# Patient Record
Sex: FEMALE | Race: OTHER | HISPANIC OR LATINO | URBAN - METROPOLITAN AREA
[De-identification: names, ages, dates, MRNs, and addresses within clinical notes are randomized per-mention and may not be internally consistent; named-entity substitution may affect disease eponyms.]

---

## 2021-05-10 ENCOUNTER — INPATIENT (INPATIENT)
Facility: HOSPITAL | Age: 66
LOS: 5 days | Discharge: HOME | DRG: 389 | End: 2021-05-16
Attending: INTERNAL MEDICINE | Admitting: INTERNAL MEDICINE
Payer: MEDICARE

## 2021-05-10 VITALS
WEIGHT: 125 LBS | HEART RATE: 76 BPM | RESPIRATION RATE: 18 BRPM | DIASTOLIC BLOOD PRESSURE: 82 MMHG | OXYGEN SATURATION: 98 % | SYSTOLIC BLOOD PRESSURE: 156 MMHG | TEMPERATURE: 98 F

## 2021-05-10 DIAGNOSIS — Z90.710 ACQUIRED ABSENCE OF BOTH CERVIX AND UTERUS: Chronic | ICD-10-CM

## 2021-05-10 DIAGNOSIS — Z98.89 OTHER SPECIFIED POSTPROCEDURAL STATES: Chronic | ICD-10-CM

## 2021-05-10 DIAGNOSIS — Z90.49 ACQUIRED ABSENCE OF OTHER SPECIFIED PARTS OF DIGESTIVE TRACT: Chronic | ICD-10-CM

## 2021-05-10 LAB
ALBUMIN SERPL ELPH-MCNC: 3.6 G/DL — SIGNIFICANT CHANGE UP (ref 3.5–5)
ALP SERPL-CCNC: 76 U/L — SIGNIFICANT CHANGE UP (ref 40–120)
ALT FLD-CCNC: 37 U/L DA — SIGNIFICANT CHANGE UP (ref 10–60)
ANION GAP SERPL CALC-SCNC: 7 MMOL/L — SIGNIFICANT CHANGE UP (ref 5–17)
APPEARANCE UR: CLEAR — SIGNIFICANT CHANGE UP
AST SERPL-CCNC: 46 U/L — HIGH (ref 10–40)
BACTERIA # UR AUTO: ABNORMAL /HPF
BASOPHILS # BLD AUTO: 0.05 K/UL — SIGNIFICANT CHANGE UP (ref 0–0.2)
BASOPHILS NFR BLD AUTO: 0.3 % — SIGNIFICANT CHANGE UP (ref 0–2)
BILIRUB SERPL-MCNC: 0.4 MG/DL — SIGNIFICANT CHANGE UP (ref 0.2–1.2)
BILIRUB UR-MCNC: NEGATIVE — SIGNIFICANT CHANGE UP
BUN SERPL-MCNC: 13 MG/DL — SIGNIFICANT CHANGE UP (ref 7–18)
CALCIUM SERPL-MCNC: 9.5 MG/DL — SIGNIFICANT CHANGE UP (ref 8.4–10.5)
CHLORIDE SERPL-SCNC: 104 MMOL/L — SIGNIFICANT CHANGE UP (ref 96–108)
CO2 SERPL-SCNC: 28 MMOL/L — SIGNIFICANT CHANGE UP (ref 22–31)
COLOR SPEC: YELLOW — SIGNIFICANT CHANGE UP
CREAT SERPL-MCNC: 0.76 MG/DL — SIGNIFICANT CHANGE UP (ref 0.5–1.3)
DIFF PNL FLD: ABNORMAL
EOSINOPHIL # BLD AUTO: 0.02 K/UL — SIGNIFICANT CHANGE UP (ref 0–0.5)
EOSINOPHIL NFR BLD AUTO: 0.1 % — SIGNIFICANT CHANGE UP (ref 0–6)
EPI CELLS # UR: SIGNIFICANT CHANGE UP /HPF
GLUCOSE SERPL-MCNC: 145 MG/DL — HIGH (ref 70–99)
GLUCOSE UR QL: NEGATIVE — SIGNIFICANT CHANGE UP
HCT VFR BLD CALC: 43.7 % — SIGNIFICANT CHANGE UP (ref 34.5–45)
HGB BLD-MCNC: 14.2 G/DL — SIGNIFICANT CHANGE UP (ref 11.5–15.5)
HYALINE CASTS # UR AUTO: ABNORMAL /LPF
IMM GRANULOCYTES NFR BLD AUTO: 0.4 % — SIGNIFICANT CHANGE UP (ref 0–1.5)
KETONES UR-MCNC: ABNORMAL
LACTATE SERPL-SCNC: 1.2 MMOL/L — SIGNIFICANT CHANGE UP (ref 0.7–2)
LEUKOCYTE ESTERASE UR-ACNC: ABNORMAL
LIDOCAIN IGE QN: 107 U/L — SIGNIFICANT CHANGE UP (ref 73–393)
LYMPHOCYTES # BLD AUTO: 14.7 % — SIGNIFICANT CHANGE UP (ref 13–44)
LYMPHOCYTES # BLD AUTO: 2.45 K/UL — SIGNIFICANT CHANGE UP (ref 1–3.3)
MCHC RBC-ENTMCNC: 29.5 PG — SIGNIFICANT CHANGE UP (ref 27–34)
MCHC RBC-ENTMCNC: 32.5 GM/DL — SIGNIFICANT CHANGE UP (ref 32–36)
MCV RBC AUTO: 90.7 FL — SIGNIFICANT CHANGE UP (ref 80–100)
MONOCYTES # BLD AUTO: 0.47 K/UL — SIGNIFICANT CHANGE UP (ref 0–0.9)
MONOCYTES NFR BLD AUTO: 2.8 % — SIGNIFICANT CHANGE UP (ref 2–14)
NEUTROPHILS # BLD AUTO: 13.57 K/UL — HIGH (ref 1.8–7.4)
NEUTROPHILS NFR BLD AUTO: 81.7 % — HIGH (ref 43–77)
NITRITE UR-MCNC: NEGATIVE — SIGNIFICANT CHANGE UP
NRBC # BLD: 0 /100 WBCS — SIGNIFICANT CHANGE UP (ref 0–0)
PH UR: 6.5 — SIGNIFICANT CHANGE UP (ref 5–8)
PLATELET # BLD AUTO: 377 K/UL — SIGNIFICANT CHANGE UP (ref 150–400)
POTASSIUM SERPL-MCNC: 4.4 MMOL/L — SIGNIFICANT CHANGE UP (ref 3.5–5.3)
POTASSIUM SERPL-SCNC: 4.4 MMOL/L — SIGNIFICANT CHANGE UP (ref 3.5–5.3)
PROT SERPL-MCNC: 8.2 G/DL — SIGNIFICANT CHANGE UP (ref 6–8.3)
PROT UR-MCNC: NEGATIVE — SIGNIFICANT CHANGE UP
RBC # BLD: 4.82 M/UL — SIGNIFICANT CHANGE UP (ref 3.8–5.2)
RBC # FLD: 12.9 % — SIGNIFICANT CHANGE UP (ref 10.3–14.5)
RBC CASTS # UR COMP ASSIST: ABNORMAL /HPF (ref 0–2)
SODIUM SERPL-SCNC: 139 MMOL/L — SIGNIFICANT CHANGE UP (ref 135–145)
SP GR SPEC: 1.01 — SIGNIFICANT CHANGE UP (ref 1.01–1.02)
UROBILINOGEN FLD QL: NEGATIVE — SIGNIFICANT CHANGE UP
WBC # BLD: 16.62 K/UL — HIGH (ref 3.8–10.5)
WBC # FLD AUTO: 16.62 K/UL — HIGH (ref 3.8–10.5)
WBC UR QL: SIGNIFICANT CHANGE UP /HPF (ref 0–5)

## 2021-05-10 PROCEDURE — 74177 CT ABD & PELVIS W/CONTRAST: CPT | Mod: 26,MA

## 2021-05-10 PROCEDURE — 99285 EMERGENCY DEPT VISIT HI MDM: CPT | Mod: CS

## 2021-05-10 RX ORDER — PANTOPRAZOLE SODIUM 20 MG/1
40 TABLET, DELAYED RELEASE ORAL ONCE
Refills: 0 | Status: COMPLETED | OUTPATIENT
Start: 2021-05-10 | End: 2021-05-10

## 2021-05-10 RX ORDER — SODIUM CHLORIDE 9 MG/ML
1000 INJECTION INTRAMUSCULAR; INTRAVENOUS; SUBCUTANEOUS ONCE
Refills: 0 | Status: COMPLETED | OUTPATIENT
Start: 2021-05-10 | End: 2021-05-10

## 2021-05-10 RX ORDER — ONDANSETRON 8 MG/1
4 TABLET, FILM COATED ORAL ONCE
Refills: 0 | Status: COMPLETED | OUTPATIENT
Start: 2021-05-10 | End: 2021-05-10

## 2021-05-10 RX ORDER — MORPHINE SULFATE 50 MG/1
4 CAPSULE, EXTENDED RELEASE ORAL ONCE
Refills: 0 | Status: DISCONTINUED | OUTPATIENT
Start: 2021-05-10 | End: 2021-05-10

## 2021-05-10 RX ORDER — MORPHINE SULFATE 50 MG/1
2 CAPSULE, EXTENDED RELEASE ORAL ONCE
Refills: 0 | Status: DISCONTINUED | OUTPATIENT
Start: 2021-05-10 | End: 2021-05-10

## 2021-05-10 RX ADMIN — MORPHINE SULFATE 4 MILLIGRAM(S): 50 CAPSULE, EXTENDED RELEASE ORAL at 22:08

## 2021-05-10 RX ADMIN — PANTOPRAZOLE SODIUM 40 MILLIGRAM(S): 20 TABLET, DELAYED RELEASE ORAL at 22:39

## 2021-05-10 RX ADMIN — MORPHINE SULFATE 2 MILLIGRAM(S): 50 CAPSULE, EXTENDED RELEASE ORAL at 22:39

## 2021-05-10 RX ADMIN — ONDANSETRON 4 MILLIGRAM(S): 8 TABLET, FILM COATED ORAL at 22:08

## 2021-05-10 RX ADMIN — MORPHINE SULFATE 4 MILLIGRAM(S): 50 CAPSULE, EXTENDED RELEASE ORAL at 22:38

## 2021-05-10 RX ADMIN — SODIUM CHLORIDE 1000 MILLILITER(S): 9 INJECTION INTRAMUSCULAR; INTRAVENOUS; SUBCUTANEOUS at 22:08

## 2021-05-10 NOTE — ED PROVIDER NOTE - CLINICAL SUMMARY MEDICAL DECISION MAKING FREE TEXT BOX
65 year old female with PMHx of gastritis, HLD, and neuropathy and no significant PSHx presents to the ED with complaints of abdominal pain. Will obtain labs, UA, and CT abdomen. Patient given morphine for pain, Zofran, and IV fluids. Will reassess. 65 year old female with PMHx of gastritis, HLD, and neuropathy and no significant PSHx presents to the ED with complaints of abdominal pain. Will obtain labs, UA, and CT abdomen. Patient given morphine for pain, Zofran, and IV fluids. Will reassess.    labs show wbc 16K, cmp unremarkable  CT shows Findings most consistent with enterocolitis with partial obstruction in the right lower quadrant of the abdomen may be due to focal small bowel wall thickening related to enteritis. Small abdominal pelvic ascites. Normal appendix.  Discussed above with patient. Currently reporting severe pain, given morphine, cipro/flagyl.   Consulted surgical house officer Dr. Akhtar for Ct findings of partial bowel obstruction.  Patient stable for admission.

## 2021-05-10 NOTE — ED PROVIDER NOTE - OBJECTIVE STATEMENT
65 year old female with PMHx of gastritis, HLD, and neuropathy and no significant PSHx presents to the ED with complaints of abdominal pain. Patient reports onset of the pain at approximately 12:00 today, and states that the pain has been waxing and waning since then. Patient complains of severe cramping associated with three episodes of vomiting, as well as two episodes of nonbloody loose stools. Patient states that she had three episodes of loose bowel movements last night after having chicken and green plantains. Patient otherwise denies any fever, cough, and all other acute complaints. Of note, patient evaluation was performed in Norwegian language. NKDA.

## 2021-05-11 DIAGNOSIS — Z29.9 ENCOUNTER FOR PROPHYLACTIC MEASURES, UNSPECIFIED: ICD-10-CM

## 2021-05-11 DIAGNOSIS — E78.5 HYPERLIPIDEMIA, UNSPECIFIED: ICD-10-CM

## 2021-05-11 DIAGNOSIS — G62.9 POLYNEUROPATHY, UNSPECIFIED: ICD-10-CM

## 2021-05-11 DIAGNOSIS — K52.9 NONINFECTIVE GASTROENTERITIS AND COLITIS, UNSPECIFIED: ICD-10-CM

## 2021-05-11 DIAGNOSIS — K29.70 GASTRITIS, UNSPECIFIED, WITHOUT BLEEDING: ICD-10-CM

## 2021-05-11 LAB
A1C WITH ESTIMATED AVERAGE GLUCOSE RESULT: 6.1 % — HIGH (ref 4–5.6)
ALBUMIN SERPL ELPH-MCNC: 2.8 G/DL — LOW (ref 3.5–5)
ALP SERPL-CCNC: 63 U/L — SIGNIFICANT CHANGE UP (ref 40–120)
ALT FLD-CCNC: 29 U/L DA — SIGNIFICANT CHANGE UP (ref 10–60)
ANION GAP SERPL CALC-SCNC: 7 MMOL/L — SIGNIFICANT CHANGE UP (ref 5–17)
AST SERPL-CCNC: 21 U/L — SIGNIFICANT CHANGE UP (ref 10–40)
BASOPHILS # BLD AUTO: 0.02 K/UL — SIGNIFICANT CHANGE UP (ref 0–0.2)
BASOPHILS NFR BLD AUTO: 0.2 % — SIGNIFICANT CHANGE UP (ref 0–2)
BILIRUB SERPL-MCNC: 0.3 MG/DL — SIGNIFICANT CHANGE UP (ref 0.2–1.2)
BUN SERPL-MCNC: 12 MG/DL — SIGNIFICANT CHANGE UP (ref 7–18)
CALCIUM SERPL-MCNC: 7.9 MG/DL — LOW (ref 8.4–10.5)
CHLORIDE SERPL-SCNC: 109 MMOL/L — HIGH (ref 96–108)
CHOLEST SERPL-MCNC: 145 MG/DL — SIGNIFICANT CHANGE UP
CO2 SERPL-SCNC: 25 MMOL/L — SIGNIFICANT CHANGE UP (ref 22–31)
CREAT SERPL-MCNC: 0.76 MG/DL — SIGNIFICANT CHANGE UP (ref 0.5–1.3)
EOSINOPHIL # BLD AUTO: 0.23 K/UL — SIGNIFICANT CHANGE UP (ref 0–0.5)
EOSINOPHIL NFR BLD AUTO: 1.8 % — SIGNIFICANT CHANGE UP (ref 0–6)
ESTIMATED AVERAGE GLUCOSE: 128 MG/DL — HIGH (ref 68–114)
GLUCOSE BLDC GLUCOMTR-MCNC: 140 MG/DL — HIGH (ref 70–99)
GLUCOSE SERPL-MCNC: 161 MG/DL — HIGH (ref 70–99)
HCT VFR BLD CALC: 38.1 % — SIGNIFICANT CHANGE UP (ref 34.5–45)
HDLC SERPL-MCNC: 74 MG/DL — SIGNIFICANT CHANGE UP
HGB BLD-MCNC: 12.3 G/DL — SIGNIFICANT CHANGE UP (ref 11.5–15.5)
IMM GRANULOCYTES NFR BLD AUTO: 0.3 % — SIGNIFICANT CHANGE UP (ref 0–1.5)
LIPID PNL WITH DIRECT LDL SERPL: 59 MG/DL — SIGNIFICANT CHANGE UP
LYMPHOCYTES # BLD AUTO: 1.12 K/UL — SIGNIFICANT CHANGE UP (ref 1–3.3)
LYMPHOCYTES # BLD AUTO: 8.8 % — LOW (ref 13–44)
MAGNESIUM SERPL-MCNC: 2 MG/DL — SIGNIFICANT CHANGE UP (ref 1.6–2.6)
MCHC RBC-ENTMCNC: 30 PG — SIGNIFICANT CHANGE UP (ref 27–34)
MCHC RBC-ENTMCNC: 32.3 GM/DL — SIGNIFICANT CHANGE UP (ref 32–36)
MCV RBC AUTO: 92.9 FL — SIGNIFICANT CHANGE UP (ref 80–100)
MONOCYTES # BLD AUTO: 0.25 K/UL — SIGNIFICANT CHANGE UP (ref 0–0.9)
MONOCYTES NFR BLD AUTO: 2 % — SIGNIFICANT CHANGE UP (ref 2–14)
NEUTROPHILS # BLD AUTO: 11.01 K/UL — HIGH (ref 1.8–7.4)
NEUTROPHILS NFR BLD AUTO: 86.9 % — HIGH (ref 43–77)
NON HDL CHOLESTEROL: 71 MG/DL — SIGNIFICANT CHANGE UP
NRBC # BLD: 0 /100 WBCS — SIGNIFICANT CHANGE UP (ref 0–0)
PHOSPHATE SERPL-MCNC: 3.1 MG/DL — SIGNIFICANT CHANGE UP (ref 2.5–4.5)
PLATELET # BLD AUTO: 318 K/UL — SIGNIFICANT CHANGE UP (ref 150–400)
POTASSIUM SERPL-MCNC: 4.4 MMOL/L — SIGNIFICANT CHANGE UP (ref 3.5–5.3)
POTASSIUM SERPL-SCNC: 4.4 MMOL/L — SIGNIFICANT CHANGE UP (ref 3.5–5.3)
PROT SERPL-MCNC: 6.4 G/DL — SIGNIFICANT CHANGE UP (ref 6–8.3)
RBC # BLD: 4.1 M/UL — SIGNIFICANT CHANGE UP (ref 3.8–5.2)
RBC # FLD: 13.1 % — SIGNIFICANT CHANGE UP (ref 10.3–14.5)
SARS-COV-2 RNA SPEC QL NAA+PROBE: SIGNIFICANT CHANGE UP
SODIUM SERPL-SCNC: 141 MMOL/L — SIGNIFICANT CHANGE UP (ref 135–145)
TRIGL SERPL-MCNC: 59 MG/DL — SIGNIFICANT CHANGE UP
TSH SERPL-MCNC: 0.35 UU/ML — SIGNIFICANT CHANGE UP (ref 0.34–4.82)
WBC # BLD: 12.67 K/UL — HIGH (ref 3.8–10.5)
WBC # FLD AUTO: 12.67 K/UL — HIGH (ref 3.8–10.5)

## 2021-05-11 PROCEDURE — 99223 1ST HOSP IP/OBS HIGH 75: CPT

## 2021-05-11 RX ORDER — ENOXAPARIN SODIUM 100 MG/ML
40 INJECTION SUBCUTANEOUS DAILY
Refills: 0 | Status: DISCONTINUED | OUTPATIENT
Start: 2021-05-11 | End: 2021-05-16

## 2021-05-11 RX ORDER — ACETAMINOPHEN 500 MG
650 TABLET ORAL EVERY 6 HOURS
Refills: 0 | Status: DISCONTINUED | OUTPATIENT
Start: 2021-05-11 | End: 2021-05-16

## 2021-05-11 RX ORDER — SIMVASTATIN 20 MG/1
1 TABLET, FILM COATED ORAL
Qty: 0 | Refills: 0 | DISCHARGE

## 2021-05-11 RX ORDER — GABAPENTIN 400 MG/1
300 CAPSULE ORAL
Refills: 0 | Status: DISCONTINUED | OUTPATIENT
Start: 2021-05-11 | End: 2021-05-16

## 2021-05-11 RX ORDER — CIPROFLOXACIN LACTATE 400MG/40ML
400 VIAL (ML) INTRAVENOUS ONCE
Refills: 0 | Status: COMPLETED | OUTPATIENT
Start: 2021-05-11 | End: 2021-05-11

## 2021-05-11 RX ORDER — GABAPENTIN 400 MG/1
1 CAPSULE ORAL
Qty: 0 | Refills: 0 | DISCHARGE

## 2021-05-11 RX ORDER — MORPHINE SULFATE 50 MG/1
2 CAPSULE, EXTENDED RELEASE ORAL ONCE
Refills: 0 | Status: DISCONTINUED | OUTPATIENT
Start: 2021-05-11 | End: 2021-05-11

## 2021-05-11 RX ORDER — OXYCODONE AND ACETAMINOPHEN 5; 325 MG/1; MG/1
1 TABLET ORAL EVERY 6 HOURS
Refills: 0 | Status: DISCONTINUED | OUTPATIENT
Start: 2021-05-11 | End: 2021-05-13

## 2021-05-11 RX ORDER — METRONIDAZOLE 500 MG
500 TABLET ORAL ONCE
Refills: 0 | Status: COMPLETED | OUTPATIENT
Start: 2021-05-11 | End: 2021-05-11

## 2021-05-11 RX ORDER — ONDANSETRON 8 MG/1
4 TABLET, FILM COATED ORAL EVERY 4 HOURS
Refills: 0 | Status: DISCONTINUED | OUTPATIENT
Start: 2021-05-11 | End: 2021-05-16

## 2021-05-11 RX ORDER — SODIUM CHLORIDE 9 MG/ML
1000 INJECTION, SOLUTION INTRAVENOUS
Refills: 0 | Status: DISCONTINUED | OUTPATIENT
Start: 2021-05-11 | End: 2021-05-12

## 2021-05-11 RX ORDER — SIMVASTATIN 20 MG/1
40 TABLET, FILM COATED ORAL AT BEDTIME
Refills: 0 | Status: DISCONTINUED | OUTPATIENT
Start: 2021-05-11 | End: 2021-05-16

## 2021-05-11 RX ORDER — FAMOTIDINE 10 MG/ML
1 INJECTION INTRAVENOUS
Qty: 0 | Refills: 0 | DISCHARGE

## 2021-05-11 RX ORDER — METRONIDAZOLE 500 MG
500 TABLET ORAL EVERY 8 HOURS
Refills: 0 | Status: DISCONTINUED | OUTPATIENT
Start: 2021-05-11 | End: 2021-05-13

## 2021-05-11 RX ORDER — FAMOTIDINE 10 MG/ML
40 INJECTION INTRAVENOUS DAILY
Refills: 0 | Status: DISCONTINUED | OUTPATIENT
Start: 2021-05-11 | End: 2021-05-16

## 2021-05-11 RX ORDER — CIPROFLOXACIN LACTATE 400MG/40ML
400 VIAL (ML) INTRAVENOUS EVERY 12 HOURS
Refills: 0 | Status: DISCONTINUED | OUTPATIENT
Start: 2021-05-11 | End: 2021-05-13

## 2021-05-11 RX ADMIN — Medication 100 MILLIGRAM(S): at 01:07

## 2021-05-11 RX ADMIN — Medication 500 MILLIGRAM(S): at 02:21

## 2021-05-11 RX ADMIN — MORPHINE SULFATE 2 MILLIGRAM(S): 50 CAPSULE, EXTENDED RELEASE ORAL at 01:04

## 2021-05-11 RX ADMIN — SODIUM CHLORIDE 80 MILLILITER(S): 9 INJECTION, SOLUTION INTRAVENOUS at 03:27

## 2021-05-11 RX ADMIN — ENOXAPARIN SODIUM 40 MILLIGRAM(S): 100 INJECTION SUBCUTANEOUS at 11:22

## 2021-05-11 RX ADMIN — FAMOTIDINE 40 MILLIGRAM(S): 10 INJECTION INTRAVENOUS at 11:22

## 2021-05-11 RX ADMIN — Medication 200 MILLIGRAM(S): at 01:07

## 2021-05-11 RX ADMIN — Medication 100 MILLIGRAM(S): at 13:42

## 2021-05-11 RX ADMIN — Medication 100 MILLIGRAM(S): at 21:52

## 2021-05-11 RX ADMIN — SODIUM CHLORIDE 1000 MILLILITER(S): 9 INJECTION INTRAMUSCULAR; INTRAVENOUS; SUBCUTANEOUS at 00:21

## 2021-05-11 RX ADMIN — Medication 400 MILLIGRAM(S): at 02:19

## 2021-05-11 RX ADMIN — Medication 200 MILLIGRAM(S): at 17:57

## 2021-05-11 RX ADMIN — GABAPENTIN 300 MILLIGRAM(S): 400 CAPSULE ORAL at 17:57

## 2021-05-11 RX ADMIN — SIMVASTATIN 40 MILLIGRAM(S): 20 TABLET, FILM COATED ORAL at 21:52

## 2021-05-11 RX ADMIN — GABAPENTIN 300 MILLIGRAM(S): 400 CAPSULE ORAL at 05:24

## 2021-05-11 NOTE — H&P ADULT - ATTENDING COMMENTS
Patient is a 65 year old female with a PMH of HLD, Prediabetes, Gastritis, Neuropathy, s/p cholecystectomy, s/p Total abdominal hysterectomy who presented with a chief complaint of abdominal pain.  ( ID: [id number])  Patient states that on the day of current presentation, she began to experience an intermittent, generalized abdominal pain associated with several episodes of watery stools as well as non-bloody emesis.    At time of examination in the ED, patient denies any headache, dizziness, chest pain, palpitations, shortness of breath.    T(C): 36.7 (05-10-21 @ 21:45), Max: 36.7 (05-10-21 @ 21:45)  T(F): 98 (05-10-21 @ 21:45), Max: 98 (05-10-21 @ 21:45)  HR: 76 (05-10-21 @ 21:45) (76 - 76)  BP: 156/82 (05-10-21 @ 21:45) (156/82 - 156/82)  RR: 18 (05-10-21 @ 21:45) (18 - 18)  SpO2: 98% (05-10-21 @ 21:45) (98% - 98%)  Wt(kg): --    P/E: As above MAR    A/P:    Abdominal Pain likely due to Partial Small Bowel Obstruction:  -CT Abdomen/Pelvis with IV contrast identified multiple prominent fluid-filled loops of small bowel with wall thickening and enhancement most consistent with partial obstruction in the right lower quadrant of the abdomen may be due to focal small bowel wall thickening related to enteritis.  Mild pancolonic wall thickening. Distended stomach with air-fluid level. Scattered colonic diverticulosis without diverticulitis. Small abdominal pelvic ascites, notably in the right paracolic gutter and cul-de-sac.  -Zofran PRN for nausea  -Reglan PRN for vomiting  -Will continue to closely monitor EKG for evidence of QT prolongation  -PPI BID  -Will keep patient NPO (except meds) for now  -If patient continues to experience episodes of non-bloody emesis, will place NG Tube for decompression, though can likely hold for now  -Will start patient on gentle IVF hydration  -Will start patient on Cipro and Flagyl  -Will need to ask GI to evaluate patient for further recommendations    Gastritis:  -As above    Hepatic lesions:  -CT Abdomen/Pelvis with IV contrast identified scattered hepatic cysts and subcentimeter hypoenhancing foci, too small to characterize.  Small perihepatic ascites.  -Patient will need to follow-up with Hepatology as an outpatient after discharge    Leukocytosis:  -Will hold antimicrobials, for now  -Nevertheless, will send ESR, CRP, Procalcitonin    Prediabetes:  -Hemoglobin A1c with AM labs  -Blood Glucose Monitoring ACHS  -Regular Insulin Sliding Scale ACHS  -Will keep patient NPO (except meds) for now    HLD:  -Lipid Panel with AM labs  -Will resume patient's home medications    Mild Transaminitis:  -Will send Hepatitis Panel    GI/DVT PPx:  -PPI BID  -Lovenox Patient is a 65 year old female with a PMH of HLD, Prediabetes, Gastritis, Neuropathy, s/p cholecystectomy, s/p Total abdominal hysterectomy who presented with a chief complaint of abdominal pain.  ( ID: [id number])  Patient states that on the day of current presentation, she began to experience an intermittent, generalized abdominal pain associated with several episodes of watery stools as well as non-bloody emesis.    At time of examination in the ED, patient denies any headache, dizziness, chest pain, palpitations, shortness of breath.    T(C): 36.7 (05-10-21 @ 21:45), Max: 36.7 (05-10-21 @ 21:45)  T(F): 98 (05-10-21 @ 21:45), Max: 98 (05-10-21 @ 21:45)  HR: 76 (05-10-21 @ 21:45) (76 - 76)  BP: 156/82 (05-10-21 @ 21:45) (156/82 - 156/82)  RR: 18 (05-10-21 @ 21:45) (18 - 18)  SpO2: 98% (05-10-21 @ 21:45) (98% - 98%)  Wt(kg): --    P/E: As above MAR    A/P:    Abdominal Pain likely due to Partial Small Bowel Obstruction:  -CT Abdomen/Pelvis with IV contrast identified multiple prominent fluid-filled loops of small bowel with wall thickening and enhancement most consistent with partial obstruction in the right lower quadrant of the abdomen may be due to focal small bowel wall thickening related to enteritis.  Mild pancolonic wall thickening. Distended stomach with air-fluid level. Scattered colonic diverticulosis without diverticulitis. Small abdominal pelvic ascites, notably in the right paracolic gutter and cul-de-sac.  -Zofran PRN for nausea  -Reglan PRN for vomiting  -Will continue to closely monitor EKG for evidence of QT prolongation  -PPI BID  -Will keep patient NPO (except meds) for now  -If patient continues to experience episodes of non-bloody emesis, will place NG Tube for decompression, though can likely hold for now  -Will start patient on gentle IVF hydration  -Will start patient on Cipro and Flagyl  -Will need to ask GI to evaluate patient for further recommendations    Nausea and Vomiting:  -As above    Watery Diarrhea:  -If patient continues to have episodes of watery diarrhea, will consider sending cdiff as well as stool electrolytes (Sodium, Potassium) in order to calculate stool osmotic gap    Gastritis:  -As above    Hepatic lesions:  -CT Abdomen/Pelvis with IV contrast identified scattered hepatic cysts and subcentimeter hypoenhancing foci, too small to characterize.  Small perihepatic ascites.  -Patient will need to follow-up with Hepatology as an outpatient after discharge    Leukocytosis:  -Will hold antimicrobials, for now  -Nevertheless, will send ESR, CRP, Procalcitonin    Prediabetes:  -Hemoglobin A1c with AM labs  -Blood Glucose Monitoring ACHS  -Regular Insulin Sliding Scale ACHS  -Will keep patient NPO (except meds) for now    HLD:  -Lipid Panel with AM labs  -Will resume patient's home medications    Mild Transaminitis:  -Will send Hepatitis Panel    GI/DVT PPx:  -PPI BID  -Lovenox

## 2021-05-11 NOTE — PROGRESS NOTE ADULT - SUBJECTIVE AND OBJECTIVE BOX
INTERVAL HPI/OVERNIGHT EVENTS:  Pt resting comfortably. No acute complaints.   States abdominal pain is improving   +flatus/-BM  Denies N/V    MEDICATIONS  (STANDING):  ciprofloxacin   IVPB 400 milliGRAM(s) IV Intermittent every 12 hours  enoxaparin Injectable 40 milliGRAM(s) SubCutaneous daily  famotidine    Tablet 40 milliGRAM(s) Oral daily  gabapentin 300 milliGRAM(s) Oral two times a day  lactated ringers. 1000 milliLiter(s) (80 mL/Hr) IV Continuous <Continuous>  metroNIDAZOLE  IVPB 500 milliGRAM(s) IV Intermittent every 8 hours  simvastatin 40 milliGRAM(s) Oral at bedtime    MEDICATIONS  (PRN):  acetaminophen   Tablet .. 650 milliGRAM(s) Oral every 6 hours PRN Mild Pain (1 - 3)  ondansetron Injectable 4 milliGRAM(s) IV Push every 4 hours PRN Nausea and/or Vomiting  oxycodone    5 mG/acetaminophen 325 mG 1 Tablet(s) Oral every 6 hours PRN Moderate Pain (4 - 6)      Vital Signs Last 24 Hrs  T(C): 36.8 (11 May 2021 09:58), Max: 36.8 (11 May 2021 09:58)  T(F): 98.2 (11 May 2021 09:58), Max: 98.2 (11 May 2021 09:58)  HR: 66 (11 May 2021 09:58) (64 - 76)  BP: 110/61 (11 May 2021 09:58) (98/59 - 156/82)  RR: 14 (11 May 2021 09:58) (14 - 18)  SpO2: 99% (11 May 2021 09:58) (98% - 99%)    Physical:  General: A&Ox3. NAD  Chest: respiration unlabored  Abdomen: Soft nondistended, nontender.    LABS:                        12.3   12.67 )-----------( 318      ( 11 May 2021 05:34 )             38.1             05-11    141  |  109<H>  |  12  ----------------------------<  161<H>  4.4   |  25  |  0.76    Ca    7.9<L>      11 May 2021 05:34  Phos  3.1     05-11  Mg     2.0     05-11    TPro  6.4  /  Alb  2.8<L>  /  TBili  0.3  /  DBili  x   /  AST  21  /  ALT  29  /  AlkPhos  63  05-11

## 2021-05-11 NOTE — CONSULT NOTE ADULT - SUBJECTIVE AND OBJECTIVE BOX
GI INITIAL CONSULT    HPI:  65 year old female with PMHx of Gastritis, HLD, Pre-diabetes and neuropathy and PSHx of cholecystectomy and hysterectomy presents to the ED with complaints of abdominal pain since today afternoon. Pt reports diffuse abdominal pain, more pronounced in LLQ and RLQ, sharp in nature, 10/10 in intensity, non-radiating, associated with 3 episodes of watery diarrhea and NBNB  vomiting yesterday. On CT Abd fount to have enterocolitis and partial SBO. This morning patient looks comfortable resting in bed, stated that abd pain has significantly improved, had not had any more episodes of diarrhea or vomiting Pt denies any fever, chest pain, palpitations, shortness of breath, urinary symptoms or any other acute complaints.    Meds:  MEDICATIONS  (STANDING):  ciprofloxacin   IVPB 400 milliGRAM(s) IV Intermittent every 12 hours  enoxaparin Injectable 40 milliGRAM(s) SubCutaneous daily  famotidine    Tablet 40 milliGRAM(s) Oral daily  gabapentin 300 milliGRAM(s) Oral two times a day  lactated ringers. 1000 milliLiter(s) (80 mL/Hr) IV Continuous <Continuous>  metroNIDAZOLE  IVPB 500 milliGRAM(s) IV Intermittent every 8 hours  simvastatin 40 milliGRAM(s) Oral at bedtime     SH: non contributory   FH: non contributory    ROS:  CONSTITUTIONAL: No fever, weight loss, or fatigue  EYES: No eye pain, visual disturbances, or discharge  ENT:  No difficulty hearing, tinnitus, vertigo; No sinus or throat pain  NECK: No pain or stiffness  RESPIRATORY: No cough, wheezing, chills or hemoptysis, shortness of Breath  CARDIOVASCULAR: No chest pain, palpitations, passing out, dizziness, or leg swelling  GASTROINTESTINAL: see HPI  GENITOURINARY: No dysuria, frequency, hematuria, or incontinence  NEUROLOGICAL: No headaches, memory loss, loss of strength, numbness, or tremors  SKIN: No itching, burning, rashes, or lesions   MUSCULOSKELETAL: No arthralgia, myalgia, or back pain.     Vitals:   Vital Signs Last 24 Hrs  T(C): 36.6 (11 May 2021 05:13), Max: 36.7 (10 May 2021 21:45)  T(F): 97.8 (11 May 2021 05:13), Max: 98 (10 May 2021 21:45)  HR: 71 (11 May 2021 05:13) (71 - 76)  BP: 145/78 (11 May 2021 05:13) (145/78 - 156/82)    Gen: NAD  CVS: S1/S2  Chest: CTABL  Abd: mild distention, mild tenderness in LLQ, soft, warm to touch                          12.3   12.67 )-----------( 318      ( 11 May 2021 05:34 )             38.1   05-11    141  |  109<H>  |  12  ----------------------------<  161<H>  4.4   |  25  |  0.76    Ca    7.9<L>      11 May 2021 05:34  Phos  3.1     05-11  Mg     2.0     05-11    TPro  6.4  /  Alb  2.8<L>  /  TBili  0.3  /  DBili  x   /  AST  21  /  ALT  29  /  AlkPhos  63  05-11        Imaging:   EXAM:  CT ABDOMEN AND PELVIS IC                            PROCEDURE DATE:  05/10/2021      INTERPRETATION:  CLINICAL INFORMATION: Diffuse abdominal pain.    FINDINGS:    LOWER CHEST: Bibasilar dependent changes. No pleural effusion.    LIVER/GALLBLADDER: Scattered hepatic cysts and subcentimeter hypoenhancing foci, too small to characterize. Normal size with homogenous enhancement. Post cholecystectomy biliary duct dilatation. Small perihepatic ascites.    PANCREAS: No pancreatic ductal dilatation or peripancreatic fluid collection.    SPLEEN: Normal in size and enhancement.    KIDNEYS: No hydronephrosis. Tiny subcentimeter hypoenhancing focus in the upper pole the left kidney, too small to characterize.    ADRENAL GLANDS: Unremarkable.    BOWEL: Multiple prominent fluid-filled loops of small bowel with wall thickening and enhancement. Fecalized small bowel loop in the right lower quadrant of the abdomen with gradual tapering (series 2 images 93-97) Mild pancolonic wall thickening. Distended stomach with air-fluid level. Scattered colonic diverticulosis without diverticulitis. Normal appendix. No bowel obstruction or free air. Small abdominal pelvic ascites, notably in the right paracolic gutter and cul-de-sac.    URINARY BLADDER: Mildly distended.    PELVIC ORGANS: Unremarkable.    LYMPH NODES: No mesenteric or para-aortic lymphadenopathy.    BONES/SOFT TISSUES: Unremarkable.    AORTA/MAJOR BRANCHES: Unremarkable.    IMPRESSION:    Findings most consistent with enterocolitis with partial obstruction in the right lower quadrant of the abdomen may be due to focal small bowel wall thickening related to enteritis. Small abdominal pelvic ascites. Normal appendix.

## 2021-05-11 NOTE — H&P ADULT - ASSESSMENT
65 year old female with PMHx of Gastritis, HLD, Pre-diabetes and neuropathy and PSHx of cholecystectomy and hysterectomy presents to the ED with complaints of abdominal pain since today afternoon.       In ED, /82, HR 76    Pt is admitted for Enterocolitis

## 2021-05-11 NOTE — H&P ADULT - NSHPPHYSICALEXAM_GEN_ALL_CORE
General - NAD  Eyes - PERRLA, EOM intact  ENT - Nonicteric sclerae, PERRLA, EOMI. Oropharynx clear. Moist mucous membranes. Conjunctivae appear well perfused.   Neck - No noticeable or palpable swelling, redness or rash around throat or on face  Lymph Nodes - No lymphadenopathy  Cardiovascular - RRR no m/r/g, no JVD, no carotid bruits  Lungs - Clear to ascultation, no use of accessory muscles, no crackles or wheezes.  Skin - No rashes, skin warm and dry, no erythematous areas  Abdomen - Diffuse abdominal tenderness present. Normal bowel sounds, no hepatosplenomegaly.  Extremities - No edema, cyanosis or clubbing  MusculoSkeletal - 5/5 strength, normal range of motion, no swollen or erythematous  joints.  Neurological – Alert and oriented x 3, CN 2-12 grossly intact.

## 2021-05-11 NOTE — H&P ADULT - PROBLEM SELECTOR PLAN 1
p/w diffuse abdominal pain and tenderness, nausea, vomiting and diarrhea since afternoon  WBC 16 K, afebrile  CT A/P shows enterocolitis with partial obstruction in the right lower quadrant of the abdomen may be due to focal small bowel wall thickening related to enteritis. Small abdominal pelvic ascites, notably in the right paracolic gutter and cul-de-sac.  NPO for now  c/w iv fluids  Started on Cipro and Flagyl  Zofran prn for nausea   Surgery consulted: Pt might need NGT decompression   GI consult p/w diffuse abdominal pain and tenderness, nausea, vomiting and diarrhea since afternoon  WBC 16 K, afebrile  CT A/P shows enterocolitis with partial obstruction in the right lower quadrant of the abdomen may be due to focal small bowel wall thickening related to enteritis. Small abdominal pelvic ascites, notably in the right paracolic gutter and cul-de-sac.  NPO for now  c/w iv fluids  Started on Cipro and Flagyl  Zofran prn for nausea   Surgery consulted: Pt might need NGT decompression   GI Dr. Almeida

## 2021-05-11 NOTE — H&P ADULT - HISTORY OF PRESENT ILLNESS
65 year old female with PMHx of Gastritis, HLD, Pre-diabetes and neuropathy and no significant PSHx presents to the ED with complaints of abdominal pain 65 year old female with PMHx of Gastritis, HLD, Pre-diabetes and neuropathy and PSHx of cholecystectomy and hysterectomy presents to the ED with complaints of abdominal pain since today afternoon. Pt reports diffuse abdominal pain, sharp in nature, 10/10 in intensity, non-radiating, associated with 3 episodes of diarrhea and NBNB  vomiting today. Pt denies any fever, chest pain, palpitations, shortness of breath, urinary symptoms or any other acute complaints.    In ED, /82, HR 76 65 year old female with PMHx of Gastritis, HLD, Pre-diabetes and neuropathy and PSHx of cholecystectomy and hysterectomy presents to the ED with complaints of abdominal pain since today afternoon. Pt reports diffuse abdominal pain, more pronounced in LLQ and RLQ, sharp in nature, 10/10 in intensity, non-radiating, associated with 3 episodes of watery diarrhea and NBNB  vomiting today. Pt denies any fever, chest pain, palpitations, shortness of breath, urinary symptoms or any other acute complaints.    In ED, /82, HR 76

## 2021-05-11 NOTE — CONSULT NOTE ADULT - SUBJECTIVE AND OBJECTIVE BOX
65 year old female with PMHx of gastritis, HLD, and neuropathy and significant PSHx including open jamie, SVEN,  presents to the ED with complaints of abdominal pain. Patient reports onset of the pain at approximately 12:00 today, and states that the pain has been waxing and waning since then. Patient complains of severe cramping associated with three episodes of vomiting, as well as two episodes of nonbloody loose stools. Patient states that she had three episodes of loose bowel movements last night after having chicken and green plantains. Patient otherwise denies any fever, cough, and all other acute complaints.    < from: CT Abdomen and Pelvis w/ IV Cont (05.10.21 @ 23:31) >  BOWEL: Multiple prominent fluid-filled loops of small bowel with wall thickening and enhancement. Fecalized small bowel loop in the right lower quadrant of the abdomen with gradual tapering (series 2 images 93-97) Mild pancolonic wall thickening. Distended stomach with air-fluid level. Scattered colonic diverticulosis without diverticulitis. Normal appendix. No bowel obstruction or free air. Small abdominal pelvic ascites, notably in the right paracolic gutter and cul-de-sac.    URINARY BLADDER: Mildly distended.    PELVIC ORGANS: Unremarkable.    LYMPH NODES: No mesenteric or para-aortic lymphadenopathy.    BONES/SOFT TISSUES: Unremarkable.    AORTA/MAJOR BRANCHES: Unremarkable.    IMPRESSION:    Findings most consistent with enterocolitis with partial obstruction in the right lower quadrant of the abdomen may be due to focal small bowel wall thickening related to enteritis. Small abdominal pelvic ascites. Normal appendix.    < end of copied text >    pt acknowledges bowel function, no current vomiting    Vital Signs Last 24 Hrs  T(C): 36.7 (10 May 2021 21:45), Max: 36.7 (10 May 2021 21:45)  T(F): 98 (10 May 2021 21:45), Max: 98 (10 May 2021 21:45)  HR: 76 (10 May 2021 21:45) (76 - 76)  BP: 156/82 (10 May 2021 21:45) (156/82 - 156/82)  BP(mean): --  RR: 18 (10 May 2021 21:45) (18 - 18)  SpO2: 98% (10 May 2021 21:45) (98% - 98%)                          14.2   16.62 )-----------( 377      ( 10 May 2021 22:11 )             43.7   05-10    139  |  104  |  13  ----------------------------<  145<H>  4.4   |  28  |  0.76    Ca    9.5      10 May 2021 22:11    TPro  8.2  /  Alb  3.6  /  TBili  0.4  /  DBili  x   /  AST  46<H>  /  ALT  37  /  AlkPhos  76  05-10    Pt awake, alert, NAD  S1S2  good b/l air entry  abd softly distended, generalized mild tenderness, no rebound or guarding    A/P  64 y/o female, pre-diabetic, c/o 1 day onset generalized abd pain with n/v/diarrhea after chicken/plaintains the night before and started again at noon earlier today  CT c/w enterocolitis with psbo in RLQ, currently no vomiting  recommend npo, hydrate, iv abx, serial exams, if n/v would need NG decompression  discussed with Dr Gonsalez

## 2021-05-11 NOTE — PROGRESS NOTE ADULT - ASSESSMENT
65F presenting with abdominal pain  CT consistent with enterocolitis with partial obstruction     vss, leukocytosis trending down  abdominal exam benign, +flatus    -NPO, IVF  -serial exams  -if nausea/vomiting continues would need NGT  -F/u GI reccs  -Remainder of care per primary team

## 2021-05-11 NOTE — CONSULT NOTE ADULT - ASSESSMENT
64 YO F with N/V, diarrhea x 5 episodes, on CT abd found to have enterocolitis with partial obstruction in the right lower quadrant of the abdomen, currently no more episodes of vomiting and diarrhea    #partial SBO, enteroclitis   - continue with cipro/flagyl as per medical team  - continue NPO  - GI PCR panel  - consider NGT if pt continues to vomit  66 YO F with N/V, diarrhea x 5 episodes, on CT abd found to have enterocolitis with partial obstruction in the right lower quadrant of the abdomen, currently no more episodes of vomiting and diarrhea    #partial SBO, enteroclitis   - continue with cipro/flagyl as per medical team  - continue NPO  - GI PCR panel  - consider NGT if pt continues to vomit       Attending Addendum:  65F p/w enterocolitis and partial SBO; has a h/o multiple abd surgeries.  -cont NPO for now  -check GI PCR panel to r/o infectious etiology  -cont IVF  -cont empiric abx  -can advance diet tomorrow to clear liquids

## 2021-05-11 NOTE — CHART NOTE - NSCHARTNOTEFT_GEN_A_CORE
EVENT: abdominal pain improving     OBJECTIVE:  Vital Signs Last 24 Hrs  T(C): 36.8 (11 May 2021 09:58), Max: 36.8 (11 May 2021 09:58)  T(F): 98.2 (11 May 2021 09:58), Max: 98.2 (11 May 2021 09:58)  HR: 66 (11 May 2021 09:58) (64 - 76)  BP: 110/61 (11 May 2021 09:58) (98/59 - 156/82)  BP(mean): --  RR: 14 (11 May 2021 09:58) (14 - 18)  SpO2: 99% (11 May 2021 09:58) (98% - 99%)    ROS: mild abd pain and nausea. no vomiting     PHYSICAL EXAM:  GENERAL: appears uncomfortable   NECK: Supple, No JVD  CHEST/LUNG: Clear to auscultation bilaterally; No wheeze  HEART: Regular rate and rhythm; No murmurs, rubs, or gallops  ABDOMEN: Soft, +ve tenderness, Bowel sounds present  EXTREMITIES:  2+ Peripheral Pulses, No clubbing, cyanosis, or edema  PSYCH: AAOx3  NEUROLOGY: non-focal  SKIN: No rashes or lesions      LABS:                        12.3   12.67 )-----------( 318      ( 11 May 2021 05:34 )             38.1     05-11    141  |  109<H>  |  12  ----------------------------<  161<H>  4.4   |  25  |  0.76    Ca    7.9<L>      11 May 2021 05:34  Phos  3.1     05-11  Mg     2.0     05-11    TPro  6.4  /  Alb  2.8<L>  /  TBili  0.3  /  DBili  x   /  AST  21  /  ALT  29  /  AlkPhos  63  05-11      < from: CT Abdomen and Pelvis w/ IV Cont (05.10.21 @ 23:31) >      EXAM:  CT ABDOMEN AND PELVIS IC                            PROCEDURE DATE:  05/10/2021          INTERPRETATION:  CLINICAL INFORMATION: Diffuse abdominal pain.    COMPARISON: None.    TECHNIQUE: Axial CT of the abdomen and pelvis was performed afterthe administration of oral and intravenous contrast. Coronal and sagittal reformatted images were submitted.    CONTRAST/COMPLICATIONS:  IV Contrast: Omnipaque 350  90 cc administered   10 cc discarded  Oral Contrast: NONE  Complications: None reported at time of study completion    FINDINGS:    LOWER CHEST: Bibasilar dependent changes. No pleural effusion.    LIVER/GALLBLADDER: Scattered hepatic cysts and subcentimeter hypoenhancing foci, too small to characterize. Normal size with homogenous enhancement. Post cholecystectomy biliary duct dilatation. Small perihepatic ascites.    PANCREAS: No pancreatic ductal dilatation or peripancreatic fluid collection.    SPLEEN: Normal in size and enhancement.    KIDNEYS: No hydronephrosis. Tiny subcentimeter hypoenhancing focus in the upper pole the left kidney, too small to characterize.    ADRENAL GLANDS: Unremarkable.    BOWEL: Multiple prominent fluid-filled loops of small bowel with wall thickening and enhancement. Fecalized small bowel loop in the right lower quadrant of the abdomen with gradual tapering (series 2 images 93-97) Mild pancolonic wall thickening. Distended stomach with air-fluid level. Scattered colonic diverticulosis without diverticulitis. Normal appendix. No bowel obstruction or free air. Small abdominal pelvic ascites, notably in the right paracolic gutter and cul-de-sac.    URINARY BLADDER: Mildly distended.    PELVIC ORGANS: Unremarkable.    LYMPH NODES: No mesenteric or para-aortic lymphadenopathy.    BONES/SOFT TISSUES: Unremarkable.    AORTA/MAJOR BRANCHES: Unremarkable.    IMPRESSION:    Findings most consistent with enterocolitis with partial obstruction in the right lower quadrant of the abdomen may be due to focal small bowel wall thickening related to enteritis. Small abdominal pelvic ascites. Normal appendix.      A/P: 65 year old female with PMHx of Gastritis, HLD, Pre-diabetes and neuropathy and PSHx of cholecystectomy and hysterectomy presents to the ED with complaints of abdominal pain.  Enterocolitis: p/w abd pain, CT as above, cont NPO, IVF, abx and pain meds, followed by surgery, no acute interventions recommended. if pt vomiting, consider NGT.   DVT ppx- cont Lovenox EVENT: seen and examined     OBJECTIVE:  Vital Signs Last 24 Hrs  T(C): 36.8 (11 May 2021 09:58), Max: 36.8 (11 May 2021 09:58)  T(F): 98.2 (11 May 2021 09:58), Max: 98.2 (11 May 2021 09:58)  HR: 66 (11 May 2021 09:58) (64 - 76)  BP: 110/61 (11 May 2021 09:58) (98/59 - 156/82)  BP(mean): --  RR: 14 (11 May 2021 09:58) (14 - 18)  SpO2: 99% (11 May 2021 09:58) (98% - 99%)    ROS:  abd pain and nausea improving,  no vomiting     PHYSICAL EXAM:  GENERAL: NAD  NECK: Supple, No JVD  CHEST/LUNG: Clear to auscultation bilaterally; No wheeze  HEART: Regular rate and rhythm; No murmurs, rubs, or gallops  ABDOMEN: Soft, +ve tenderness, Bowel sounds present  EXTREMITIES:  2+ Peripheral Pulses, No clubbing, cyanosis, or edema  PSYCH: AAOx3  NEUROLOGY: non-focal  SKIN: No rashes or lesions      LABS:                        12.3   12.67 )-----------( 318      ( 11 May 2021 05:34 )             38.1     05-11    141  |  109<H>  |  12  ----------------------------<  161<H>  4.4   |  25  |  0.76    Ca    7.9<L>      11 May 2021 05:34  Phos  3.1     05-11  Mg     2.0     05-11    TPro  6.4  /  Alb  2.8<L>  /  TBili  0.3  /  DBili  x   /  AST  21  /  ALT  29  /  AlkPhos  63  05-11      < from: CT Abdomen and Pelvis w/ IV Cont (05.10.21 @ 23:31) >      EXAM:  CT ABDOMEN AND PELVIS IC                            PROCEDURE DATE:  05/10/2021          INTERPRETATION:  CLINICAL INFORMATION: Diffuse abdominal pain.    COMPARISON: None.    TECHNIQUE: Axial CT of the abdomen and pelvis was performed afterthe administration of oral and intravenous contrast. Coronal and sagittal reformatted images were submitted.    CONTRAST/COMPLICATIONS:  IV Contrast: Omnipaque 350  90 cc administered   10 cc discarded  Oral Contrast: NONE  Complications: None reported at time of study completion    FINDINGS:    LOWER CHEST: Bibasilar dependent changes. No pleural effusion.    LIVER/GALLBLADDER: Scattered hepatic cysts and subcentimeter hypoenhancing foci, too small to characterize. Normal size with homogenous enhancement. Post cholecystectomy biliary duct dilatation. Small perihepatic ascites.    PANCREAS: No pancreatic ductal dilatation or peripancreatic fluid collection.    SPLEEN: Normal in size and enhancement.    KIDNEYS: No hydronephrosis. Tiny subcentimeter hypoenhancing focus in the upper pole the left kidney, too small to characterize.    ADRENAL GLANDS: Unremarkable.    BOWEL: Multiple prominent fluid-filled loops of small bowel with wall thickening and enhancement. Fecalized small bowel loop in the right lower quadrant of the abdomen with gradual tapering (series 2 images 93-97) Mild pancolonic wall thickening. Distended stomach with air-fluid level. Scattered colonic diverticulosis without diverticulitis. Normal appendix. No bowel obstruction or free air. Small abdominal pelvic ascites, notably in the right paracolic gutter and cul-de-sac.    URINARY BLADDER: Mildly distended.    PELVIC ORGANS: Unremarkable.    LYMPH NODES: No mesenteric or para-aortic lymphadenopathy.    BONES/SOFT TISSUES: Unremarkable.    AORTA/MAJOR BRANCHES: Unremarkable.    IMPRESSION:    Findings most consistent with enterocolitis with partial obstruction in the right lower quadrant of the abdomen may be due to focal small bowel wall thickening related to enteritis. Small abdominal pelvic ascites. Normal appendix.      A/P: 65 year old female with PMHx of Gastritis, HLD, Pre-diabetes and neuropathy and PSHx of cholecystectomy and hysterectomy presents to the ED with complaints of abdominal pain.  Enterocolitis: p/w abd pain, CT as above, cont NPO, IVF, abx and pain meds, followed by surgery, no acute interventions recommended. if pt vomiting, consider NGT.   DVT ppx- cont Lovenox

## 2021-05-12 DIAGNOSIS — K76.89 OTHER SPECIFIED DISEASES OF LIVER: ICD-10-CM

## 2021-05-12 LAB
ALBUMIN SERPL ELPH-MCNC: 2.5 G/DL — LOW (ref 3.5–5)
ALP SERPL-CCNC: 53 U/L — SIGNIFICANT CHANGE UP (ref 40–120)
ALT FLD-CCNC: 23 U/L DA — SIGNIFICANT CHANGE UP (ref 10–60)
ANION GAP SERPL CALC-SCNC: 8 MMOL/L — SIGNIFICANT CHANGE UP (ref 5–17)
AST SERPL-CCNC: 22 U/L — SIGNIFICANT CHANGE UP (ref 10–40)
BILIRUB SERPL-MCNC: 0.3 MG/DL — SIGNIFICANT CHANGE UP (ref 0.2–1.2)
BUN SERPL-MCNC: 7 MG/DL — SIGNIFICANT CHANGE UP (ref 7–18)
CALCIUM SERPL-MCNC: 8.1 MG/DL — LOW (ref 8.4–10.5)
CHLORIDE SERPL-SCNC: 112 MMOL/L — HIGH (ref 96–108)
CO2 SERPL-SCNC: 23 MMOL/L — SIGNIFICANT CHANGE UP (ref 22–31)
COVID-19 SPIKE DOMAIN AB INTERP: POSITIVE
COVID-19 SPIKE DOMAIN ANTIBODY RESULT: >250 U/ML — HIGH
CREAT SERPL-MCNC: 0.66 MG/DL — SIGNIFICANT CHANGE UP (ref 0.5–1.3)
CULTURE RESULTS: SIGNIFICANT CHANGE UP
GGT SERPL-CCNC: 8 U/L — SIGNIFICANT CHANGE UP (ref 8–40)
GLUCOSE SERPL-MCNC: 96 MG/DL — SIGNIFICANT CHANGE UP (ref 70–99)
HAV IGM SER-ACNC: SIGNIFICANT CHANGE UP
HBV CORE IGM SER-ACNC: SIGNIFICANT CHANGE UP
HBV SURFACE AG SER-ACNC: SIGNIFICANT CHANGE UP
HCT VFR BLD CALC: 34.9 % — SIGNIFICANT CHANGE UP (ref 34.5–45)
HCV AB S/CO SERPL IA: 0.12 S/CO — SIGNIFICANT CHANGE UP (ref 0–0.99)
HCV AB S/CO SERPL IA: 0.12 S/CO — SIGNIFICANT CHANGE UP (ref 0–0.99)
HCV AB SERPL-IMP: SIGNIFICANT CHANGE UP
HCV AB SERPL-IMP: SIGNIFICANT CHANGE UP
HGB BLD-MCNC: 11 G/DL — LOW (ref 11.5–15.5)
MAGNESIUM SERPL-MCNC: 2.2 MG/DL — SIGNIFICANT CHANGE UP (ref 1.6–2.6)
MCHC RBC-ENTMCNC: 29.5 PG — SIGNIFICANT CHANGE UP (ref 27–34)
MCHC RBC-ENTMCNC: 31.5 GM/DL — LOW (ref 32–36)
MCV RBC AUTO: 93.6 FL — SIGNIFICANT CHANGE UP (ref 80–100)
NRBC # BLD: 0 /100 WBCS — SIGNIFICANT CHANGE UP (ref 0–0)
PHOSPHATE SERPL-MCNC: 2.8 MG/DL — SIGNIFICANT CHANGE UP (ref 2.5–4.5)
PLATELET # BLD AUTO: 270 K/UL — SIGNIFICANT CHANGE UP (ref 150–400)
POTASSIUM SERPL-MCNC: 3.8 MMOL/L — SIGNIFICANT CHANGE UP (ref 3.5–5.3)
POTASSIUM SERPL-SCNC: 3.8 MMOL/L — SIGNIFICANT CHANGE UP (ref 3.5–5.3)
PROT SERPL-MCNC: 5.6 G/DL — LOW (ref 6–8.3)
RBC # BLD: 3.73 M/UL — LOW (ref 3.8–5.2)
RBC # FLD: 13.3 % — SIGNIFICANT CHANGE UP (ref 10.3–14.5)
SARS-COV-2 IGG+IGM SERPL QL IA: >250 U/ML — HIGH
SARS-COV-2 IGG+IGM SERPL QL IA: POSITIVE
SODIUM SERPL-SCNC: 143 MMOL/L — SIGNIFICANT CHANGE UP (ref 135–145)
SPECIMEN SOURCE: SIGNIFICANT CHANGE UP
WBC # BLD: 6.41 K/UL — SIGNIFICANT CHANGE UP (ref 3.8–10.5)
WBC # FLD AUTO: 6.41 K/UL — SIGNIFICANT CHANGE UP (ref 3.8–10.5)

## 2021-05-12 PROCEDURE — 99233 SBSQ HOSP IP/OBS HIGH 50: CPT | Mod: GC

## 2021-05-12 PROCEDURE — 99223 1ST HOSP IP/OBS HIGH 75: CPT

## 2021-05-12 RX ORDER — SODIUM CHLORIDE 9 MG/ML
1000 INJECTION, SOLUTION INTRAVENOUS
Refills: 0 | Status: DISCONTINUED | OUTPATIENT
Start: 2021-05-12 | End: 2021-05-13

## 2021-05-12 RX ORDER — LANOLIN ALCOHOL/MO/W.PET/CERES
5 CREAM (GRAM) TOPICAL AT BEDTIME
Refills: 0 | Status: DISCONTINUED | OUTPATIENT
Start: 2021-05-12 | End: 2021-05-16

## 2021-05-12 RX ADMIN — OXYCODONE AND ACETAMINOPHEN 1 TABLET(S): 5; 325 TABLET ORAL at 10:20

## 2021-05-12 RX ADMIN — SIMVASTATIN 40 MILLIGRAM(S): 20 TABLET, FILM COATED ORAL at 21:21

## 2021-05-12 RX ADMIN — ENOXAPARIN SODIUM 40 MILLIGRAM(S): 100 INJECTION SUBCUTANEOUS at 11:37

## 2021-05-12 RX ADMIN — Medication 200 MILLIGRAM(S): at 05:02

## 2021-05-12 RX ADMIN — Medication 100 MILLIGRAM(S): at 21:21

## 2021-05-12 RX ADMIN — SODIUM CHLORIDE 80 MILLILITER(S): 9 INJECTION, SOLUTION INTRAVENOUS at 08:20

## 2021-05-12 RX ADMIN — Medication 100 MILLIGRAM(S): at 06:07

## 2021-05-12 RX ADMIN — FAMOTIDINE 40 MILLIGRAM(S): 10 INJECTION INTRAVENOUS at 11:37

## 2021-05-12 RX ADMIN — Medication 200 MILLIGRAM(S): at 17:05

## 2021-05-12 RX ADMIN — GABAPENTIN 300 MILLIGRAM(S): 400 CAPSULE ORAL at 17:05

## 2021-05-12 RX ADMIN — Medication 5 MILLIGRAM(S): at 22:25

## 2021-05-12 RX ADMIN — GABAPENTIN 300 MILLIGRAM(S): 400 CAPSULE ORAL at 06:07

## 2021-05-12 RX ADMIN — OXYCODONE AND ACETAMINOPHEN 1 TABLET(S): 5; 325 TABLET ORAL at 09:33

## 2021-05-12 RX ADMIN — Medication 100 MILLIGRAM(S): at 13:16

## 2021-05-12 NOTE — PROGRESS NOTE ADULT - PROBLEM SELECTOR PLAN 5
RISK                                                          Points  [  ] Previous VTE                                                3  [  ] Thrombophilia                                             2  [  ] Lower limb paralysis                                   2        (unable to hold up >15 seconds)    [  ] Current Cancer                                             2         (within 6 months)  [ x ] Immobilization > 24 hrs                              1  [  ] ICU/CCU stay > 24 hours                             1  [ x ] Age > 60                                                         1    IMPROVE VTE Score: 2  lovenox for VTE prophylaxis. c/w gabapentin

## 2021-05-12 NOTE — PROGRESS NOTE ADULT - ATTENDING COMMENTS
Patient seen and examined. Case discussed with Dr. Gonzalez. 65 female with PMHx of Gastritis, HLD, Pre-diabetes and neuropathy presents to the ED with abdominal pain. CT A/P significant for enterocolitis and partial small bowel obstruction.  This morning she reports lower abdominal discomfort with distension. Patient passing flatus. No bowel movements since admission. Abdominal exam mild tenderness Right lower quadrant, +BS.  Appreciate Surgery recommendations, conservative management and keep NPO. c/w IV fluids, c/w cipro and flagyl. Leukocytosis trending down  CT A/P also significant for multiple hepatic cyst Hepatology consulted, likely simple cysts.     ID #[id number] Patient seen and examined. Case discussed with Dr. Gonzalez. 65 female with PMHx of Gastritis, HLD, Pre-diabetes and neuropathy presents to the ED with abdominal pain. CT A/P significant for enterocolitis and partial small bowel obstruction.  This morning she reports lower abdominal discomfort with distension. Patient passing flatus. No bowel movements since admission. Abdominal exam mild tenderness Right lower quadrant, +BS.  Appreciate Surgery recommendations, conservative management and diet advanced to clear liquids, c/w cipro and flagyl. Leukocytosis trending down CT A/P also significant for multiple hepatic cyst Hepatology consulted, likely simple cysts.    [place]  ID #[id number]

## 2021-05-12 NOTE — PROGRESS NOTE ADULT - ASSESSMENT
65y.o. Female with improving enterocolitis    -con't abx course  -Keep NPO  -Pain control prn  -OOB ambulate  -f/u stool cx

## 2021-05-12 NOTE — CONSULT NOTE ADULT - PROBLEM SELECTOR RECOMMENDATION 9
On CT scan , scattered hepatic cysts noted  Simple cysts vs parasitic cyst vs neoplastic vs duct related cysts    Simple cysts On CT scan , scattered hepatic cysts noted  Simple cysts vs parasitic cyst vs neoplastic vs duct related cysts        Simple cysts- Simple cysts tend to occur more commonly in the right lobe and are more prevalent in women  since the size of the cysts are small, thus is more consistent with the imaging on CT scan.  this is the unlikely the source of the patients abdominal pain because the cysts are small and LFTs are WNL    Parasitic cysts: Echinococcal (hydatid) cysts of the liver are caused by the larval form of Echinococcus granulosus, which is usually acquired from infected dogs. unlikely because the cysts on CT scan do not resemble fluid filled sac membrane. Also, pt denies having pets or taking care of a dog.     Unlikely duct related since no cysts report on CT scan of the CBD    Unlikely neoplasm of the since LFTs are WNL and no lesions noted on CT scan.     Recommend     Hep B and Hep C panel

## 2021-05-12 NOTE — PROGRESS NOTE ADULT - ASSESSMENT
65 year old female with PMHx of Gastritis, HLD, Pre-diabetes and neuropathy and PSHx of cholecystectomy and hysterectomy presents to the ED with complaints of abdominal pain since today afternoon.       In ED, /82, HR 76    Pt is admitted for Enterocolitis  65 year old female with PMHx of Gastritis, HLD, Pre-diabetes and neuropathy and PSHx of cholecystectomy and hysterectomy presents to the ED with complaints of abdominal pain since today afternoon.     In ED, /82, HR 76    Pt is admitted for Enterocolitis

## 2021-05-12 NOTE — PROGRESS NOTE ADULT - SUBJECTIVE AND OBJECTIVE BOX
INTERVAL HPI/OVERNIGHT EVENTS:  Pt resting comfortably. Still with lower abd pain.  NPO.  +flatus, no BM  Denies N/V.    MEDICATIONS  (STANDING):  ciprofloxacin   IVPB 400 milliGRAM(s) IV Intermittent every 12 hours  enoxaparin Injectable 40 milliGRAM(s) SubCutaneous daily  famotidine    Tablet 40 milliGRAM(s) Oral daily  gabapentin 300 milliGRAM(s) Oral two times a day  lactated ringers. 1000 milliLiter(s) (80 mL/Hr) IV Continuous <Continuous>  metroNIDAZOLE  IVPB 500 milliGRAM(s) IV Intermittent every 8 hours  simvastatin 40 milliGRAM(s) Oral at bedtime    MEDICATIONS  (PRN):  acetaminophen   Tablet .. 650 milliGRAM(s) Oral every 6 hours PRN Mild Pain (1 - 3)  ondansetron Injectable 4 milliGRAM(s) IV Push every 4 hours PRN Nausea and/or Vomiting  oxycodone    5 mG/acetaminophen 325 mG 1 Tablet(s) Oral every 6 hours PRN Moderate Pain (4 - 6)    Vital Signs Last 24 Hrs  T(C): 36.7 (12 May 2021 05:33), Max: 36.9 (11 May 2021 14:20)  T(F): 98 (12 May 2021 05:33), Max: 98.5 (11 May 2021 14:20)  HR: 60 (12 May 2021 05:33) (58 - 66)  BP: 109/61 (12 May 2021 05:33) (100/55 - 110/61)  BP(mean): --  RR: 15 (12 May 2021 05:33) (14 - 17)  SpO2: 98% (12 May 2021 05:33) (98% - 99%)    Physical:  General: A&Ox3. NAD.  Abdomen: Soft nondistended, lower abd tenderness suprapubic > R.    LABS:                        11.0   6.41  )-----------( 270      ( 12 May 2021 07:45 )             34.9             05-12    143  |  112<H>  |  7   ----------------------------<  96  3.8   |  23  |  0.66    Ca    8.1<L>      12 May 2021 07:45  Phos  3.1     05-11  Mg     2.0     05-11    TPro  5.6<L>  /  Alb  2.5<L>  /  TBili  0.3  /  DBili  x   /  AST  22  /  ALT  23  /  AlkPhos  53  05-12

## 2021-05-12 NOTE — CONSULT NOTE ADULT - ASSESSMENT
This is a 65y Female with extensive medical Hx including prediabetes (HB A1c 6.1) , HLD, gastritis, GERD, neuropathy, s/p cholecystectomy, s/p hysterectomy presented on 5/11/21 with 1 days Hx of diffuse, sharp abdominal pain, with punctum maximum in RLQ and LLQ, a/w 3 episodes of watery diarrhea and NBNB vomiting, but no fever or chills.     This is a 65y Female with extensive medical Hx including prediabetes (HB A1c 6.1) , HLD, gastritis, GERD, neuropathy, s/p cholecystectomy, s/p hysterectomy presented on 5/11/21 with 1 days Hx of diffuse, sharp abdominal pain, with punctum maximum in RLQ and LLQ, a/w 3 episodes of watery diarrhea and NBNB vomiting, but no fever or chills.        # Scattered hepatic cysts  # Small subcentimeter hypoenhanced foci  - To d/w radiology if simple cysts or any suspicion for complex cyst and how many total  - In case of simple cysts, no intervention needed unless symptomatic and in this case unlikely the source of the patients abdominal pain because the cysts are small   - Can consider sending Echinococcus and Entamoeba serology  - Given her chronic Hep B consider dedicated liver imaging w MRI and send AFP      # Chronic Hep B, not on treatment as per discussion with PCP (Dr. Rick Hewitt [phone])  - Check HBsAg, HBcAb, HBsAB, HBeAb, HBeAG, HBV DNA     # Enterocolitis  - GI PCR panel, stool culture, WBC, O/P, C. diff.   - F/u GI and surgery recommendations    Thank you fo the consult  Will continue to follow  D/w primary team This is a 65y Female with extensive medical Hx including prediabetes (HB A1c 6.1) , HLD, gastritis, GERD, neuropathy, s/p cholecystectomy, s/p hysterectomy presented on 5/11/21 with 1 days Hx of diffuse, sharp abdominal pain, with punctum maximum in RLQ and LLQ, a/w 3 episodes of watery diarrhea and NBNB vomiting, but no fever or chills, admitted to medicine for enterocolitis with partial SBO, being managed conservatively, on Cipro/Flagyl. Hepatology is consulted for incidentally found hepatic cysts.        # Scattered hepatic cysts  # Small subcentimeter hypoenhancing foci  - Reviewed imaging and discussed with radiology, the larger cysts (still small in size) are simple cysts, and the even smaller ones (tiny) are too small to characterize   - In case of simple cysts, no intervention needed unless symptomatic and in this case unlikely the source of the patients abdominal pain because the cysts are small   - Can consider sending Echinococcus and Entamoeba serology although less likely  - Given her questionable chronic Hep B history (see below) send AFP  - MRI abd w/wo contrast could help to better characterize the small hypoenhancing foci (notably patient also has some biliary dilation thus would consider MRCP too at the same time)      # Chronic Hep B (not on treatment) (?) as per discussion with PCP (Dr. Rick Hewitt [phone]), but patient is not aware (although states that her mother had some kind of viral hepatitis)   - Check HBsAg, HBcAb, HBsAB, HBeAb, HBeAG, HBV DNA     # Enterocolitis  - GI PCR panel, stool culture, WBC, O/P, C. diff.   - F/u GI and surgery recommendations    Thank you fo the consult  Will continue to follow  D/w primary team

## 2021-05-12 NOTE — PROGRESS NOTE ADULT - PROBLEM SELECTOR PLAN 2
c/w famotidine P/w Abd pain, NVD and distension.   CT : Scattered hepatic cysts and subcentimeter hypoenhancing foci, too small to characterize. Normal size with homogenous enhancement. Post cholecystectomy biliary duct dilatation. Small perihepatic ascites.  CBC with leukocytosis, Neut predom. No elevation in eosinophilia  LFTs wnl, no fevers  - f/u hepatitis panel  - f/u GGT  HEPATOLOGY Dr. Jones consulted

## 2021-05-12 NOTE — PROGRESS NOTE ADULT - PROBLEM SELECTOR PLAN 1
p/w diffuse abdominal pain and tenderness, nausea, vomiting and diarrhea since afternoon  WBC 16 K, afebrile  CT A/P shows enterocolitis with partial obstruction in the right lower quadrant of the abdomen may be due to focal small bowel wall thickening related to enteritis. Small abdominal pelvic ascites, notably in the right paracolic gutter and cul-de-sac.  NPO for now  c/w iv fluids  Started on Cipro and Flagyl  Zofran prn for nausea   Surgery consulted: Pt might need NGT decompression   GI Dr. Almeida p/w diffuse abdominal pain and tenderness, nausea, vomiting and diarrhea since afternoon  WBC 16 K, afebrile  CT A/P shows enterocolitis with partial obstruction in the right lower quadrant of the abdomen may be due to focal small bowel wall thickening related to enteritis. Small abdominal pelvic ascites, notably in the right paracolic gutter and cul-de-sac.  NPO for now  c/w iv fluids  Cipro and Flagyl 5/11  Zofran prn for nausea   SURGERY consulted (Dr. Gonsalez)  -- con't abx course  -- Keep NPO  -- Pain control prn  -- OOB ambulate  -- f/u stool cx  GI Dr. Almeida  -- cont NPO for now  -- check GI PCR panel to r/o infectious etiology  -- cont IVF  -- cont empiric abx  -- can advance diet to clear liquids on 5/12 p/w diffuse abdominal pain and tenderness, nausea, vomiting and diarrhea since afternoon  WBC 16 K, afebrile  CT A/P shows enterocolitis with partial obstruction in the right lower quadrant of the abdomen may be due to focal small bowel wall thickening related to enteritis. Small abdominal pelvic ascites, notably in the right paracolic gutter and cul-de-sac.  NPO > adv to clears per surgery and GI on 5/12  c/w iv fluids  Cipro and Flagyl 5/11  Zofran prn for nausea   SURGERY consulted (Dr. Gonsalez)  -- con't abx course  -- Keep NPO  -- Pain control prn  -- OOB ambulate  -- f/u stool cx  GI Dr. Almeida  -- cont NPO for now  -- check GI PCR panel to r/o infectious etiology  -- cont IVF  -- cont empiric abx  -- can advance diet to clear liquids on 5/12

## 2021-05-12 NOTE — CONSULT NOTE ADULT - SUBJECTIVE AND OBJECTIVE BOX
Chief Complaint:  Patient is a 65y old  Female who presents with a chief complaint of Abdominal pain (12 May 2021 09:14)      HPI:  HEYDI OCONNOR is a 65y Female with extensive medical Hx including prediabetes (HB A1c 6.1) , HLD, gastritis, GERD, neuropathy, s/p cholecystectomy, s/p hysterectomy presented on 21 with 1 days Hx of diffuse, sharp abdominal pain, with punctum maximum in RLQ and LLQ, a/w 3 episodes of watery diarrhea and NBNB vomiting, but no fever or chills.    On arrival was afebrile, not tachycardic, with borderline BP (98/59 mmHg).   Labs were significant for leukocytosis (WBC 16.62-> 12.67->6.41), normal hemoglobin and PLT count (14.2 and 377), with Hb drop to 11 since admission. Hypoproteinemia (total protein 8.2-> 5.6?, alb 3.6->2.5). UA had trace Leuk esterase and hematuria, but UCx was negative.   CT abd/pelvis 5/10/21: scattered hepatic cysts and subcm hypoenhancing foci, too small to characterize, but normal liver size and echogenicity, also some biliary dilation suggested to be post cholecystectomy, and small perihepatic ascites. It also showed multiple prominent fluid-filled loops of small bowel with wall thickening and enhancement; fecalized small bowel loop in the right lower quadrant of the abdomen with gradual tapering; mild pancolonic wall thickening. and distended stomach with air-fluid level, as well as scattered colonic diverticulosis without diverticulitis. Findings were most consistent with enterocolitis with partial obstruction in the right lower quadrant of the abdomen, may be due to focal small bowel wall thickening related to enteritis.   Surgery and GI are following patient. Patient is on cipro/flagyl.   Hepatology was consulted for hepatic cysts.       PMHX/PSHX:    GERD (gastroesophageal reflux disease)  Asthma  Hyperlipidemia  Gastritis  Neuropathy  H/O abdominal hysterectomy  S/P  section  S/P cholecystectomy    Allergies:  No Known Allergies      Home Medications: reviewed  Hospital Medications:  acetaminophen   Tablet .. 650 milliGRAM(s) Oral every 6 hours PRN  ciprofloxacin   IVPB 400 milliGRAM(s) IV Intermittent every 12 hours  enoxaparin Injectable 40 milliGRAM(s) SubCutaneous daily  famotidine    Tablet 40 milliGRAM(s) Oral daily  gabapentin 300 milliGRAM(s) Oral two times a day  lactated ringers. 1000 milliLiter(s) IV Continuous <Continuous>  metroNIDAZOLE  IVPB 500 milliGRAM(s) IV Intermittent every 8 hours  ondansetron Injectable 4 milliGRAM(s) IV Push every 4 hours PRN  oxycodone    5 mG/acetaminophen 325 mG 1 Tablet(s) Oral every 6 hours PRN  simvastatin 40 milliGRAM(s) Oral at bedtime      Social History:   Tob: Denies  EtOH: Denies  Illicit Drugs: Denies    Family history:  No pertinent family history in first degree relatives      Denies family history of colon cancer/polyps, stomach cancer/polyps, pancreatic cancer/masses, liver cancer/disease, ovarian cancer and endometrial cancer.    ROS:   General:  No  fevers, chills, night sweats, fatigue  Eyes:  Good vision, no reported pain  ENT:  No sore throat, pain, runny nose  CV:  No pain, palpitations  Pulm:  No dyspnea, cough  GI:  See HPI, otherwise negative  :  No  incontinence, nocturia  Muscle:  No pain, weakness  Neuro:  No memory problems  Psych:  No insomnia, mood problems, depression  Endocrine:  No polyuria, polydipsia, cold/heat intolerance  Heme:  No petechiae, ecchymosis, easy bruisability  Skin:  No rash    PHYSICAL EXAM:   Vital Signs:  Vital Signs Last 24 Hrs  T(C): 36.7 (12 May 2021 05:33), Max: 36.9 (11 May 2021 14:20)  T(F): 98 (12 May 2021 05:33), Max: 98.5 (11 May 2021 14:20)  HR: 60 (12 May 2021 05:33) (58 - 62)  BP: 109/61 (12 May 2021 05:33) (100/55 - 109/61)  BP(mean): --  RR: 15 (12 May 2021 05:33) (15 - 17)  SpO2: 98% (12 May 2021 05:33) (98% - 99%)  Daily Height in cm: 170.18 (12 May 2021 07:37)    Daily     GENERAL: no acute distress  NEURO: alert, no asterixis  HEENT: anicteric sclera, no conjunctival pallor appreciated  CHEST: no respiratory distress, no accessory muscle use  CARDIAC: regular rate, rhythm  ABDOMEN: soft, non-tender, non-distended, no rebound or guarding  EXTREMITIES: warm, well perfused, no edema  SKIN: no lesions noted    LABS: reviewed                        11.0   6.41  )-----------( 270      ( 12 May 2021 07:45 )             34.9     05-12    143  |  112<H>  |  7   ----------------------------<  96  3.8   |  23  |  0.66    Ca    8.1<L>      12 May 2021 07:45  Phos  2.8     -  Mg     2.2     -    TPro  5.6<L>  /  Alb  2.5<L>  /  TBili  0.3  /  DBili  x   /  AST  22  /  ALT  23  /  AlkPhos  53  05-12    LIVER FUNCTIONS - ( 12 May 2021 07:45 )  Alb: 2.5 g/dL / Pro: 5.6 g/dL / ALK PHOS: 53 U/L / ALT: 23 U/L DA / AST: 22 U/L / GGT: x             Culture - Urine (collected 11 May 2021 03:54)  Source: .Urine Clean Catch (Midstream)  Final Report (12 May 2021 00:32):    <10,000 CFU/mL Normal Urogenital Sunshine        Diagnostic Studies: see sunrise for full report         Chief Complaint:  Patient is a 65y old  Female who presents with a chief complaint of Abdominal pain (12 May 2021 09:14)      HPI:  HEYDI OCONNOR is a 65y Female with extensive medical Hx including prediabetes (HB A1c 6.1) , HLD, gastritis, GERD, neuropathy, s/p cholecystectomy, s/p hysterectomy presented on 21 with 1 days Hx of diffuse, sharp abdominal pain, with punctum maximum in RLQ and LLQ, a/w 3 episodes of watery diarrhea and NBNB vomiting, but no fever or chills.    On arrival was afebrile, not tachycardic, with borderline BP (98/59 mmHg).   Labs were significant for leukocytosis (WBC 16.62-> 12.67->6.41), normal hemoglobin and PLT count (14.2 and 377), with Hb drop to 11 since admission. Hypoproteinemia (total protein 8.2-> 5.6?, alb 3.6->2.5). UA had trace Leuk esterase and hematuria, but UCx was negative.   CT abd/pelvis 5/10/21: scattered hepatic cysts and subcm hypoenhancing foci, too small to characterize, but normal liver size and echogenicity, also some biliary dilation suggested to be post cholecystectomy, and small perihepatic ascites. It also showed multiple prominent fluid-filled loops of small bowel with wall thickening and enhancement; fecalized small bowel loop in the right lower quadrant of the abdomen with gradual tapering; mild pancolonic wall thickening. and distended stomach with air-fluid level, as well as scattered colonic diverticulosis without diverticulitis. Findings were most consistent with enterocolitis with partial obstruction in the right lower quadrant of the abdomen, may be due to focal small bowel wall thickening related to enteritis.   Surgery and GI are following patient. Patient is on cipro/flagyl.   Hepatology was consulted for hepatic cysts.     Travel: moved from [place] 40 yrs ago[place]   No recent travel    Pets: no pets           PMHX/PSHX:    GERD (gastroesophageal reflux disease)  Asthma  Hyperlipidemia  Gastritis  Neuropathy  H/O abdominal hysterectomy  S/P  section  S/P cholecystectomy    Allergies:  No Known Allergies      Home Medications: reviewed  Hospital Medications:  acetaminophen   Tablet .. 650 milliGRAM(s) Oral every 6 hours PRN  ciprofloxacin   IVPB 400 milliGRAM(s) IV Intermittent every 12 hours  enoxaparin Injectable 40 milliGRAM(s) SubCutaneous daily  famotidine    Tablet 40 milliGRAM(s) Oral daily  gabapentin 300 milliGRAM(s) Oral two times a day  lactated ringers. 1000 milliLiter(s) IV Continuous <Continuous>  metroNIDAZOLE  IVPB 500 milliGRAM(s) IV Intermittent every 8 hours  ondansetron Injectable 4 milliGRAM(s) IV Push every 4 hours PRN  oxycodone    5 mG/acetaminophen 325 mG 1 Tablet(s) Oral every 6 hours PRN  simvastatin 40 milliGRAM(s) Oral at bedtime      Social History:   Tob: Denies  EtOH: Denies  Illicit Drugs: Denies    Family history:  No pertinent family history in first degree relatives      Denies family history of colon cancer/polyps, stomach cancer/polyps, pancreatic cancer/masses, liver cancer/disease, ovarian cancer and endometrial cancer.    ROS:   General:  No  fevers, chills, night sweats, fatigue  Eyes:  Good vision, no reported pain  ENT:  No sore throat, pain, runny nose  CV:  No pain, palpitations  Pulm:  No dyspnea, cough  GI:  See HPI, otherwise negative  :  No  incontinence, nocturia  Muscle:  No pain, weakness  Neuro:  No memory problems  Psych:  No insomnia, mood problems, depression  Endocrine:  No polyuria, polydipsia, cold/heat intolerance  Heme:  No petechiae, ecchymosis, easy bruisability  Skin:  No rash    PHYSICAL EXAM:   Vital Signs:  Vital Signs Last 24 Hrs  T(C): 36.7 (12 May 2021 05:33), Max: 36.9 (11 May 2021 14:20)  T(F): 98 (12 May 2021 05:33), Max: 98.5 (11 May 2021 14:20)  HR: 60 (12 May 2021 05:33) (58 - 62)  BP: 109/61 (12 May 2021 05:33) (100/55 - 109/61)  BP(mean): --  RR: 15 (12 May 2021 05:33) (15 - 17)  SpO2: 98% (12 May 2021 05:33) (98% - 99%)  Daily Height in cm: 170.18 (12 May 2021 07:37)    Daily     GENERAL: no acute distress  NEURO: alert, no asterixis  HEENT: anicteric sclera, no conjunctival pallor appreciated  CHEST: no respiratory distress, no accessory muscle use  CARDIAC: regular rate, rhythm  ABDOMEN: soft, non-tender, non-distended, no rebound or guarding  EXTREMITIES: warm, well perfused, no edema  SKIN: no lesions noted    LABS: reviewed                        11.0   6.41  )-----------( 270      ( 12 May 2021 07:45 )             34.9     05-12    143  |  112<H>  |  7   ----------------------------<  96  3.8   |  23  |  0.66    Ca    8.1<L>      12 May 2021 07:45  Phos  2.8     05-  Mg     2.2     -    TPro  5.6<L>  /  Alb  2.5<L>  /  TBili  0.3  /  DBili  x   /  AST  22  /  ALT  23  /  AlkPhos  53  05-12    LIVER FUNCTIONS - ( 12 May 2021 07:45 )  Alb: 2.5 g/dL / Pro: 5.6 g/dL / ALK PHOS: 53 U/L / ALT: 23 U/L DA / AST: 22 U/L / GGT: x             Culture - Urine (collected 11 May 2021 03:54)  Source: .Urine Clean Catch (Midstream)  Final Report (12 May 2021 00:32):    <10,000 CFU/mL Normal Urogenital Sunshine        Diagnostic Studies: see sunrise for full report         Chief Complaint:  Patient is a 65y old  Female who presents with a chief complaint of Abdominal pain (12 May 2021 09:14)    : [place], [place] [id number]    HPI:  HEYDI OCONNOR is a 65y Female with extensive medical Hx including prediabetes (HB A1c 6.1) , HLD, gastritis, GERD, neuropathy, s/p cholecystectomy, s/p hysterectomy presented on 21 with 1 days Hx of diffuse, sharp abdominal pain, with punctum maximum in RLQ and LLQ, a/w 3 episodes of watery diarrhea and NBNB vomiting, but no fever or chills.    On arrival was afebrile, not tachycardic, with borderline BP (98/59 mmHg).   Labs were significant for leukocytosis (WBC 16.62-> 12.67->6.41), normal hemoglobin and PLT count (14.2 and 377), with Hb drop to 11 since admission. Hypoproteinemia (total protein 8.2-> 5.6?, alb 3.6->2.5). UA had trace Leuk esterase and hematuria, but UCx was negative.   CT abd/pelvis 5/10/21: scattered hepatic cysts and subcm hypoenhancing foci, too small to characterize, but normal liver size and echogenicity, also some biliary dilation suggested to be post cholecystectomy, and small perihepatic ascites. It also showed multiple prominent fluid-filled loops of small bowel with wall thickening and enhancement; fecalized small bowel loop in the right lower quadrant of the abdomen with gradual tapering; mild pancolonic wall thickening. and distended stomach with air-fluid level, as well as scattered colonic diverticulosis without diverticulitis. Findings were most consistent with enterocolitis with partial obstruction in the right lower quadrant of the abdomen, may be due to focal small bowel wall thickening related to enteritis.   Surgery and GI are following patient. Patient is on cipro/flagyl.   Hepatology was consulted for hepatic cysts.     Travel: moved from [place] 40 yrs ago[place]   No recent travel    Pets: no pets       PMHX/PSHX:    GERD (gastroesophageal reflux disease)  Asthma  Hyperlipidemia  Gastritis  Neuropathy  H/O abdominal hysterectomy  S/P  section  S/P cholecystectomy    Allergies:  No Known Allergies      Home Medications: reviewed  Hospital Medications:  acetaminophen   Tablet .. 650 milliGRAM(s) Oral every 6 hours PRN  ciprofloxacin   IVPB 400 milliGRAM(s) IV Intermittent every 12 hours  enoxaparin Injectable 40 milliGRAM(s) SubCutaneous daily  famotidine    Tablet 40 milliGRAM(s) Oral daily  gabapentin 300 milliGRAM(s) Oral two times a day  lactated ringers. 1000 milliLiter(s) IV Continuous <Continuous>  metroNIDAZOLE  IVPB 500 milliGRAM(s) IV Intermittent every 8 hours  ondansetron Injectable 4 milliGRAM(s) IV Push every 4 hours PRN  oxycodone    5 mG/acetaminophen 325 mG 1 Tablet(s) Oral every 6 hours PRN  simvastatin 40 milliGRAM(s) Oral at bedtime      Social History:   Tob: Denies  EtOH: Denies  Illicit Drugs: Denies    Family history:  Sister Uterine carcinoma, Mother unknown liver disease    ROS:   General:  No  fevers, chills, night sweats, fatigue  Eyes:  Good vision, no reported pain  ENT:  No sore throat, pain, runny nose  CV:  No pain, palpitations  Pulm:  No dyspnea, cough  GI:  Still c/o diffuse abdominal pain, bloating, no N/V, no BM since admission, no bleeding  :  No  dysuria  Muscle:  No pain, weakness  Neuro:  No memory problems  Heme:  No petechiae, ecchymosis, easy bruisability  Skin:  No rash    PHYSICAL EXAM:   Vital Signs:  Vital Signs Last 24 Hrs  T(C): 36.7 (12 May 2021 05:33), Max: 36.9 (11 May 2021 14:20)  T(F): 98 (12 May 2021 05:33), Max: 98.5 (11 May 2021 14:20)  HR: 60 (12 May 2021 05:33) (58 - 62)  BP: 109/61 (12 May 2021 05:33) (100/55 - 109/61)  BP(mean): --  RR: 15 (12 May 2021 05:33) (15 - 17)  SpO2: 98% (12 May 2021 05:33) (98% - 99%)  Daily Height in cm: 170.18 (12 May 2021 07:37)    Daily     GENERAL: no acute distress  NEURO: AAOx3,no asterixis  HEENT: anicteric sclera, no conjunctival pallor appreciated  CHEST: no respiratory distress, no accessory muscle use  CARDIAC: regular rate, rhythm  ABDOMEN: soft, distended, normoactive bowel sounds, diffusely tender, punctum max RUQ, no rebound or guarding, neg Callahan  EXTREMITIES: warm, well perfused, no edema  SKIN: redness around nails (recent manicure), reports pus    LABS: reviewed                        11.0   6.41  )-----------( 270      ( 12 May 2021 07:45 )             34.9     05-12    143  |  112<H>  |  7   ----------------------------<  96  3.8   |  23  |  0.66    Ca    8.1<L>     12 May 2021 07:45  Phos  2.8     05-12  Mg     2.2     -12    TPro  5.6<L>  /  Alb  2.5<L>  /  TBili  0.3  /  DBili  x   /  AST  22  /  ALT  23  /  AlkPhos  53  05-12    LIVER FUNCTIONS - ( 12 May 2021 07:45 )  Alb: 2.5 g/dL / Pro: 5.6 g/dL / ALK PHOS: 53 U/L / ALT: 23 U/L DA / AST: 22 U/L / GGT: x             Culture - Urine (collected 11 May 2021 03:54)  Source: .Urine Clean Catch (Midstream)  Final Report (12 May 2021 00:32):    <10,000 CFU/mL Normal Urogenital Sunshine        Diagnostic Studies: see sunrise for full report         Chief Complaint:  Patient is a 65y old  Female who presents with a chief complaint of Abdominal pain (12 May 2021 09:14)    : [place], [place] [id number] and [id number]    HPI:  HEYDI OCONNOR is a 65y Female with extensive medical Hx including prediabetes (HB A1c 6.1) , HLD, gastritis, GERD, neuropathy, s/p cholecystectomy, s/p hysterectomy presented on 21 with 1 days Hx of diffuse, sharp abdominal pain, with punctum maximum in RLQ and LLQ, a/w 3 episodes of watery diarrhea and NBNB vomiting, but no fever or chills.    On arrival was afebrile, not tachycardic, with borderline BP (98/59 mmHg).   Labs were significant for leukocytosis (WBC 16.62-> 12.67->6.41), normal hemoglobin and PLT count (14.2 and 377), with Hb drop to 11 since admission. Hypoproteinemia (total protein 8.2-> 5.6?, alb 3.6->2.5). UA had trace Leuk esterase and hematuria, but UCx was negative.   CT abd/pelvis 5/10/21: scattered hepatic cysts and subcm hypoenhancing foci, too small to characterize, but normal liver size and echogenicity, also some biliary dilation suggested to be post cholecystectomy, and small perihepatic ascites. It also showed multiple prominent fluid-filled loops of small bowel with wall thickening and enhancement; fecalized small bowel loop in the right lower quadrant of the abdomen with gradual tapering; mild pancolonic wall thickening and distended stomach with air-fluid level, as well as scattered colonic diverticulosis without diverticulitis. Findings were most consistent with enterocolitis with partial obstruction in the right lower quadrant of the abdomen, may be due to focal small bowel wall thickening related to enteritis.   Surgery and GI are following patient. Patient is on Cipro/Flagyl.   Hepatology was consulted for hepatic cysts.     Travel: moved from [place] 40 yrs ago[place]   No recent travel    Pets: no pets       PMHX/PSHX:    GERD (gastroesophageal reflux disease)  Asthma  Hyperlipidemia  Gastritis  Neuropathy  H/O abdominal hysterectomy  S/P  section  S/P cholecystectomy    Allergies:  No Known Allergies      Home Medications: reviewed  Hospital Medications:  acetaminophen   Tablet .. 650 milliGRAM(s) Oral every 6 hours PRN  ciprofloxacin   IVPB 400 milliGRAM(s) IV Intermittent every 12 hours  enoxaparin Injectable 40 milliGRAM(s) SubCutaneous daily  famotidine    Tablet 40 milliGRAM(s) Oral daily  gabapentin 300 milliGRAM(s) Oral two times a day  lactated ringers. 1000 milliLiter(s) IV Continuous <Continuous>  metroNIDAZOLE  IVPB 500 milliGRAM(s) IV Intermittent every 8 hours  ondansetron Injectable 4 milliGRAM(s) IV Push every 4 hours PRN  oxycodone    5 mG/acetaminophen 325 mG 1 Tablet(s) Oral every 6 hours PRN  simvastatin 40 milliGRAM(s) Oral at bedtime      Social History:   Tob: Denies  EtOH: Denies  Illicit Drugs: Denies    Family history:  Sister Uterine carcinoma, Mother unknown liver disease    ROS:   General:  No  fevers, chills, night sweats, fatigue  Eyes:  Good vision, no reported pain  ENT:  No sore throat, pain, runny nose  CV:  No pain, palpitations  Pulm:  No dyspnea, cough  GI:  Still c/o diffuse abdominal pain, bloating, no N/V, no BM since admission, no bleeding  :  No  dysuria  Muscle:  No pain, weakness  Neuro:  No memory problems  Heme:  No petechiae, ecchymosis, easy bruisability  Skin:  No rash    PHYSICAL EXAM:   Vital Signs:  Vital Signs Last 24 Hrs  T(C): 36.7 (12 May 2021 05:33), Max: 36.9 (11 May 2021 14:20)  T(F): 98 (12 May 2021 05:33), Max: 98.5 (11 May 2021 14:20)  HR: 60 (12 May 2021 05:33) (58 - 62)  BP: 109/61 (12 May 2021 05:33) (100/55 - 109/61)  BP(mean): --  RR: 15 (12 May 2021 05:33) (15 - 17)  SpO2: 98% (12 May 2021 05:33) (98% - 99%)  Daily Height in cm: 170.18 (12 May 2021 07:37)    Daily     GENERAL: no acute distress  NEURO: AAOx3,no asterixis  HEENT: anicteric sclera, no conjunctival pallor appreciated  CHEST: no respiratory distress, no accessory muscle use  CARDIAC: regular rate, rhythm  ABDOMEN: soft, mildly distended, normoactive bowel sounds, diffusely tender, punctum max periumbilical, RLQ, LLQ, no rebound or guarding, neg Callahan  EXTREMITIES: warm, well perfused, no edema  SKIN: redness around nails (recent manicure), reports pus    LABS: reviewed                        11.0   6.41  )-----------( 270      ( 12 May 2021 07:45 )             34.9     05-12    143  |  112<H>  |  7   ----------------------------<  96  3.8   |  23  |  0.66    Ca    8.1<L>     12 May 2021 07:45  Phos  2.8     -  Mg     2.2     -    TPro  5.6<L>  /  Alb  2.5<L>  /  TBili  0.3  /  DBili  x   /  AST  22  /  ALT  23  /  AlkPhos  53  05-12    LIVER FUNCTIONS - ( 12 May 2021 07:45 )  Alb: 2.5 g/dL / Pro: 5.6 g/dL / ALK PHOS: 53 U/L / ALT: 23 U/L DA / AST: 22 U/L / GGT: x             Culture - Urine (collected 11 May 2021 03:54)  Source: .Urine Clean Catch (Midstream)  Final Report (12 May 2021 00:32):    <10,000 CFU/mL Normal Urogenital Sunshine        Diagnostic Studies: see sunrise for full report

## 2021-05-12 NOTE — PROGRESS NOTE ADULT - SUBJECTIVE AND OBJECTIVE BOX
PGY-1 Progress Note discussed with attending    PAGER #: [[phone]] TILL 5:00 PM  PLEASE CONTACT ON CALL TEAM:   - On Call Team (Please refer to Mj) FROM 5:00 PM - 8:30PM  - Nightfloat Team FROM 8:30 -7:30 AM    CHIEF COMPLAINT & BRIEF HOSPITAL COURSE:      INTERVAL HPI/OVERNIGHT EVENTS:       REVIEW OF SYSTEMS:  CONSTITUTIONAL: No fever, weight loss, or fatigue  RESPIRATORY: No cough, wheezing, chills or hemoptysis; No shortness of breath  CARDIOVASCULAR: No chest pain, palpitations, dizziness, or leg swelling  GASTROINTESTINAL: No abdominal pain. No nausea, vomiting, or hematemesis; No diarrhea or constipation. No melena or hematochezia.  GENITOURINARY: No dysuria or hematuria, urinary frequency  NEUROLOGICAL: No headaches, memory loss, loss of strength, numbness, or tremors  SKIN: No itching, burning, rashes, or lesions     MEDICATIONS  (STANDING):  ciprofloxacin   IVPB 400 milliGRAM(s) IV Intermittent every 12 hours  enoxaparin Injectable 40 milliGRAM(s) SubCutaneous daily  famotidine    Tablet 40 milliGRAM(s) Oral daily  gabapentin 300 milliGRAM(s) Oral two times a day  lactated ringers. 1000 milliLiter(s) (80 mL/Hr) IV Continuous <Continuous>  metroNIDAZOLE  IVPB 500 milliGRAM(s) IV Intermittent every 8 hours  simvastatin 40 milliGRAM(s) Oral at bedtime    MEDICATIONS  (PRN):  acetaminophen   Tablet .. 650 milliGRAM(s) Oral every 6 hours PRN Mild Pain (1 - 3)  ondansetron Injectable 4 milliGRAM(s) IV Push every 4 hours PRN Nausea and/or Vomiting  oxycodone    5 mG/acetaminophen 325 mG 1 Tablet(s) Oral every 6 hours PRN Moderate Pain (4 - 6)      Vital Signs Last 24 Hrs  T(C): 36.7 (12 May 2021 05:33), Max: 36.9 (11 May 2021 14:20)  T(F): 98 (12 May 2021 05:33), Max: 98.5 (11 May 2021 14:20)  HR: 60 (12 May 2021 05:33) (58 - 66)  BP: 109/61 (12 May 2021 05:33) (100/55 - 110/61)  BP(mean): --  RR: 15 (12 May 2021 05:33) (14 - 17)  SpO2: 98% (12 May 2021 05:33) (98% - 99%)    PHYSICAL EXAMINATION:  GENERAL: NAD, well built  HEAD:  Atraumatic, Normocephalic  EYES:  conjunctiva and sclera clear  NECK: Supple, No JVD, Normal thyroid  CHEST/LUNG: Clear to auscultation. Clear to percussion bilaterally; No rales, rhonchi, wheezing, or rubs  HEART: Regular rate and rhythm; No murmurs, rubs, or gallops  ABDOMEN: Soft, Nontender, Nondistended; Bowel sounds present  NERVOUS SYSTEM:  Alert & Oriented X3,    EXTREMITIES:  2+ Peripheral Pulses, No clubbing, cyanosis, or edema  SKIN: warm dry                          11.0   6.41  )-----------( 270      ( 12 May 2021 07:45 )             34.9     05-12    143  |  112<H>  |  7   ----------------------------<  96  3.8   |  23  |  0.66    Ca    8.1<L>      12 May 2021 07:45  Phos  3.1     05-11  Mg     2.0     05-11    TPro  5.6<L>  /  Alb  2.5<L>  /  TBili  0.3  /  DBili  x   /  AST  22  /  ALT  23  /  AlkPhos  53  05-12    LIVER FUNCTIONS - ( 12 May 2021 07:45 )  Alb: 2.5 g/dL / Pro: 5.6 g/dL / ALK PHOS: 53 U/L / ALT: 23 U/L DA / AST: 22 U/L / GGT: x                     Culture - Urine (collected 11 May 2021 03:54)  Source: .Urine Clean Catch (Midstream)  Final Report (12 May 2021 00:32):    <10,000 CFU/mL Normal Urogenital Sunshine        I&O's Summary      CAPILLARY BLOOD GLUCOSE      POCT Blood Glucose.: 140 mg/dL (11 May 2021 09:01)    CAPILLARY BLOOD GLUCOSE          RADIOLOGY & ADDITIONAL TESTS:                   PGY-1 Progress Note discussed with attending    PAGER #: [[phone]] TILL 5:00 PM  PLEASE CONTACT ON CALL TEAM:   - On Call Team (Please refer to Mj) FROM 5:00 PM - 8:30PM  - Nightfloat Team FROM 8:30 -7:30 AM    CHIEF COMPLAINT & BRIEF HOSPITAL COURSE:  65 F Gastritis, HLD, Pre-DM and neuropathy and PSHx of cholecystectomy and hysterectomy p/w diffuse sharp abdominal pain x1 day in LLQ and RLQ, 10/10 in intensity, non-radiating, associated with 3 episodes of watery diarrhea and NBNB  vomiting today. Pt denies any fever, chest pain, palpitations, shortness of breath, urinary sxs. In ED, /82, HR 76. CT a/p showed distended abdomen w/ air fluid level, enterocolitis with partial obstruction in the right lower quadrant of the abdomen may be due to focal small bowel wall thickening related to enteritis. Small abdominal pelvic ascites, notably in the right paracolic gutter and cul-de-sac. Also noted were multiple scattered hepatic cysts. WBC 16k. Made NPO. Started on Abx Cipro Flagyl.Surgery cons rec for NGT decompression if vomiting continues. GI cons Dr. Almeida. Hepatology Dr. Jones was consulted for hepatic cysts.    INTERVAL HPI/OVERNIGHT EVENTS:   Overnight surgery evaluated and made no recs for NGT at this time. GI recs for diet to be advanced to clears, will discuss with surgery. No episodes of emesis overnight and no bowel movements. Reports that she is passing gas. Vitals stable, leukocytosis improved. Afebrile.    REVIEW OF SYSTEMS:  CONSTITUTIONAL: No fever, weight loss, or fatigue  RESPIRATORY: No cough, wheezing, chills or hemoptysis; No shortness of breath  CARDIOVASCULAR: No chest pain, palpitations, dizziness, or leg swelling  GASTROINTESTINAL: + abdominal bloating. + abdominal pain resolving. + nausea, No vomiting, or hematemesis; No diarrhea or constipation. No melena or hematochezia.  GENITOURINARY: No dysuria or hematuria, urinary frequency  NEUROLOGICAL: No headaches, memory loss, loss of strength, numbness, or tremors  SKIN: No itching, burning, rashes, or lesions     MEDICATIONS  (STANDING):  ciprofloxacin   IVPB 400 milliGRAM(s) IV Intermittent every 12 hours  enoxaparin Injectable 40 milliGRAM(s) SubCutaneous daily  famotidine    Tablet 40 milliGRAM(s) Oral daily  gabapentin 300 milliGRAM(s) Oral two times a day  lactated ringers. 1000 milliLiter(s) (80 mL/Hr) IV Continuous <Continuous>  metroNIDAZOLE  IVPB 500 milliGRAM(s) IV Intermittent every 8 hours  simvastatin 40 milliGRAM(s) Oral at bedtime    MEDICATIONS  (PRN):  acetaminophen   Tablet .. 650 milliGRAM(s) Oral every 6 hours PRN Mild Pain (1 - 3)  ondansetron Injectable 4 milliGRAM(s) IV Push every 4 hours PRN Nausea and/or Vomiting  oxycodone    5 mG/acetaminophen 325 mG 1 Tablet(s) Oral every 6 hours PRN Moderate Pain (4 - 6)      Vital Signs Last 24 Hrs  T(C): 36.7 (12 May 2021 05:33), Max: 36.9 (11 May 2021 14:20)  T(F): 98 (12 May 2021 05:33), Max: 98.5 (11 May 2021 14:20)  HR: 60 (12 May 2021 05:33) (58 - 66)  BP: 109/61 (12 May 2021 05:33) (100/55 - 110/61)  BP(mean): --  RR: 15 (12 May 2021 05:33) (14 - 17)  SpO2: 98% (12 May 2021 05:33) (98% - 99%)    PHYSICAL EXAMINATION:  GENERAL: NAD, OOBTC, on RA  HEAD:  Atraumatic, Normocephalic  EYES:  conjunctiva and sclera clear  NECK: Supple, No JVD,   CHEST/LUNG: Clear to auscultation. No rales, rhonchi, wheezing, or rubs  HEART: Regular rate and rhythm; No murmurs, rubs, or gallops  ABDOMEN: + marked distension, Soft, Nontender; normal to slight hyperactive Bowel sounds present in all four quadrants.  NERVOUS SYSTEM:  Alert & Oriented X3,    EXTREMITIES:  2+ Peripheral Pulses, No clubbing, cyanosis, or edema  SKIN: warm dry                          11.0   6.41  )-----------( 270      ( 12 May 2021 07:45 )             34.9     05-12    143  |  112<H>  |  7   ----------------------------<  96  3.8   |  23  |  0.66    Ca    8.1<L>      12 May 2021 07:45  Phos  3.1     05-11  Mg     2.0     05-11    TPro  5.6<L>  /  Alb  2.5<L>  /    TBili  0.3  /  DBili  x   /    AST  22  /  ALT  23  /  AlkPhos  53  05-12    LIVER FUNCTIONS - ( 12 May 2021 07:45 )  Alb: 2.5 g/dL / Pro: 5.6 g/dL /   ALK PHOS: 53 U/L / ALT: 23 U/L DA / AST: 22 U/L / GGT: x           Culture - Urine (collected 11 May 2021 03:54)  Source: .Urine Clean Catch (Midstream)  Final Report (12 May 2021 00:32):    <10,000 CFU/mL Normal Urogenital Sunshine        I&O's Summary      CAPILLARY BLOOD GLUCOSE  POCT Blood Glucose.: 140 mg/dL (11 May 2021 09:01)    CAPILLARY BLOOD GLUCOSE      RADIOLOGY & ADDITIONAL TESTS:      < from: CT Abdomen and Pelvis w/ IV Cont (05.10.21 @ 23:31) >    EXAM:  CT ABDOMEN AND PELVIS IC                            PROCEDURE DATE:  05/10/2021          INTERPRETATION:  CLINICAL INFORMATION: Diffuse abdominal pain.    COMPARISON: None.    TECHNIQUE: Axial CT of the abdomen and pelvis was performed afterthe administration of oral and intravenous contrast. Coronal and sagittal reformatted images were submitted.    CONTRAST/COMPLICATIONS:  IV Contrast: Omnipaque 350  90 cc administered   10 cc discarded  Oral Contrast: NONE  Complications: None reported at time of study completion    FINDINGS:    LOWER CHEST: Bibasilar dependent changes. No pleural effusion.    LIVER/GALLBLADDER: Scattered hepatic cysts and subcentimeter hypoenhancing foci, too small to characterize. Normal size with homogenous enhancement. Post cholecystectomy biliary duct dilatation. Small perihepatic ascites.    PANCREAS: No pancreatic ductal dilatation or peripancreatic fluid collection.    SPLEEN: Normal in size and enhancement.    KIDNEYS: No hydronephrosis. Tiny subcentimeter hypoenhancing focus in the upper pole the left kidney, too small to characterize.    ADRENAL GLANDS: Unremarkable.    BOWEL: Multiple prominent fluid-filled loops of small bowel with wall thickening and enhancement. Fecalized small bowel loop in the right lower quadrant of the abdomen with gradual tapering (series 2 images 93-97) Mild pancolonic wall thickening. Distended stomach with air-fluid level. Scattered colonic diverticulosis without diverticulitis. Normal appendix. No bowel obstruction or free air. Small abdominal pelvic ascites, notably in the right paracolic gutter and cul-de-sac.    URINARY BLADDER: Mildly distended.    PELVIC ORGANS: Unremarkable.    LYMPH NODES: No mesenteric or para-aortic lymphadenopathy.    BONES/SOFT TISSUES: Unremarkable.    AORTA/MAJOR BRANCHES: Unremarkable.    IMPRESSION:    Findings most consistent with enterocolitis with partial obstruction in the right lower quadrant of the abdomen may be due to focal small bowel wall thickening related to enteritis. Small abdominal pelvic ascites. Normal appendix.      ADILSON SONG MD; Attending Radiologist  This document has been electronically signed. May 11 2021 12:41AM    < end of copied text >           PGY-1 Progress Note discussed with attending    PAGER #: [[phone]] TILL 5:00 PM  PLEASE CONTACT ON CALL TEAM:   - On Call Team (Please refer to Mj) FROM 5:00 PM - 8:30PM  - Nightfloat Team FROM 8:30 -7:30 AM    CHIEF COMPLAINT & BRIEF HOSPITAL COURSE:  65 F Gastritis, HLD, Pre-DM and neuropathy and PSHx of cholecystectomy and hysterectomy p/w diffuse sharp abdominal pain x1 day in LLQ and RLQ, 10/10 in intensity, non-radiating, associated with 3 episodes of watery diarrhea and NBNB  vomiting today. Pt denies any fever, chest pain, palpitations, shortness of breath, urinary sxs. In ED, /82, HR 76. CT a/p showed distended abdomen w/ air fluid level, enterocolitis with partial obstruction in the right lower quadrant of the abdomen may be due to focal small bowel wall thickening related to enteritis. Small abdominal pelvic ascites, notably in the right paracolic gutter and cul-de-sac. Also noted were multiple scattered hepatic cysts. WBC 16k. Made NPO. Started on Abx Cipro Flagyl.Surgery cons rec for NGT decompression if vomiting continues. GI cons Dr. Almeida. Hepatology Dr. Jones was consulted for hepatic cysts.    INTERVAL HPI/OVERNIGHT EVENTS:   Overnight surgery evaluated and made no recs for NGT at this time. No episodes of emesis overnight and no bowel movements. Reports that she is passing gas. Vitals stable, leukocytosis improved. Afebrile.GI recs for diet to be advanced to clears. Discussed with surgery and as she is symptomatically improving they are agreeable to advance to clear liquids.     REVIEW OF SYSTEMS:  CONSTITUTIONAL: No fever, weight loss, or fatigue  RESPIRATORY: No cough, wheezing, chills or hemoptysis; No shortness of breath  CARDIOVASCULAR: No chest pain, palpitations, dizziness, or leg swelling  GASTROINTESTINAL: + abdominal bloating. + abdominal pain resolving. + nausea, No vomiting, or hematemesis; No diarrhea or constipation. No melena or hematochezia.  GENITOURINARY: No dysuria or hematuria, urinary frequency  NEUROLOGICAL: No headaches, memory loss, loss of strength, numbness, or tremors  SKIN: No itching, burning, rashes, or lesions     MEDICATIONS  (STANDING):  ciprofloxacin   IVPB 400 milliGRAM(s) IV Intermittent every 12 hours  enoxaparin Injectable 40 milliGRAM(s) SubCutaneous daily  famotidine    Tablet 40 milliGRAM(s) Oral daily  gabapentin 300 milliGRAM(s) Oral two times a day  lactated ringers. 1000 milliLiter(s) (80 mL/Hr) IV Continuous <Continuous>  metroNIDAZOLE  IVPB 500 milliGRAM(s) IV Intermittent every 8 hours  simvastatin 40 milliGRAM(s) Oral at bedtime    MEDICATIONS  (PRN):  acetaminophen   Tablet .. 650 milliGRAM(s) Oral every 6 hours PRN Mild Pain (1 - 3)  ondansetron Injectable 4 milliGRAM(s) IV Push every 4 hours PRN Nausea and/or Vomiting  oxycodone    5 mG/acetaminophen 325 mG 1 Tablet(s) Oral every 6 hours PRN Moderate Pain (4 - 6)      Vital Signs Last 24 Hrs  T(C): 36.7 (12 May 2021 05:33), Max: 36.9 (11 May 2021 14:20)  T(F): 98 (12 May 2021 05:33), Max: 98.5 (11 May 2021 14:20)  HR: 60 (12 May 2021 05:33) (58 - 66)  BP: 109/61 (12 May 2021 05:33) (100/55 - 110/61)  BP(mean): --  RR: 15 (12 May 2021 05:33) (14 - 17)  SpO2: 98% (12 May 2021 05:33) (98% - 99%)    PHYSICAL EXAMINATION:  GENERAL: NAD, OOBTC, on RA  HEAD:  Atraumatic, Normocephalic  EYES:  conjunctiva and sclera clear  NECK: Supple, No JVD,   CHEST/LUNG: Clear to auscultation. No rales, rhonchi, wheezing, or rubs  HEART: Regular rate and rhythm; No murmurs, rubs, or gallops  ABDOMEN: + marked distension, Soft, Nontender; normal to slight hyperactive Bowel sounds present in all four quadrants.  NERVOUS SYSTEM:  Alert & Oriented X3,    EXTREMITIES:  2+ Peripheral Pulses, No clubbing, cyanosis, or edema  SKIN: warm dry                          11.0   6.41  )-----------( 270      ( 12 May 2021 07:45 )             34.9     05-12    143  |  112<H>  |  7   ----------------------------<  96  3.8   |  23  |  0.66    Ca    8.1<L>      12 May 2021 07:45  Phos  3.1     05-11  Mg     2.0     05-11    TPro  5.6<L>  /  Alb  2.5<L>  /    TBili  0.3  /  DBili  x   /    AST  22  /  ALT  23  /  AlkPhos  53  05-12    LIVER FUNCTIONS - ( 12 May 2021 07:45 )  Alb: 2.5 g/dL / Pro: 5.6 g/dL /   ALK PHOS: 53 U/L / ALT: 23 U/L DA / AST: 22 U/L / GGT: x           Culture - Urine (collected 11 May 2021 03:54)  Source: .Urine Clean Catch (Midstream)  Final Report (12 May 2021 00:32):    <10,000 CFU/mL Normal Urogenital Sunshine        I&O's Summary      CAPILLARY BLOOD GLUCOSE  POCT Blood Glucose.: 140 mg/dL (11 May 2021 09:01)    CAPILLARY BLOOD GLUCOSE      RADIOLOGY & ADDITIONAL TESTS:      < from: CT Abdomen and Pelvis w/ IV Cont (05.10.21 @ 23:31) >    EXAM:  CT ABDOMEN AND PELVIS IC                            PROCEDURE DATE:  05/10/2021          INTERPRETATION:  CLINICAL INFORMATION: Diffuse abdominal pain.    COMPARISON: None.    TECHNIQUE: Axial CT of the abdomen and pelvis was performed afterthe administration of oral and intravenous contrast. Coronal and sagittal reformatted images were submitted.    CONTRAST/COMPLICATIONS:  IV Contrast: Omnipaque 350  90 cc administered   10 cc discarded  Oral Contrast: NONE  Complications: None reported at time of study completion    FINDINGS:    LOWER CHEST: Bibasilar dependent changes. No pleural effusion.    LIVER/GALLBLADDER: Scattered hepatic cysts and subcentimeter hypoenhancing foci, too small to characterize. Normal size with homogenous enhancement. Post cholecystectomy biliary duct dilatation. Small perihepatic ascites.    PANCREAS: No pancreatic ductal dilatation or peripancreatic fluid collection.    SPLEEN: Normal in size and enhancement.    KIDNEYS: No hydronephrosis. Tiny subcentimeter hypoenhancing focus in the upper pole the left kidney, too small to characterize.    ADRENAL GLANDS: Unremarkable.    BOWEL: Multiple prominent fluid-filled loops of small bowel with wall thickening and enhancement. Fecalized small bowel loop in the right lower quadrant of the abdomen with gradual tapering (series 2 images 93-97) Mild pancolonic wall thickening. Distended stomach with air-fluid level. Scattered colonic diverticulosis without diverticulitis. Normal appendix. No bowel obstruction or free air. Small abdominal pelvic ascites, notably in the right paracolic gutter and cul-de-sac.    URINARY BLADDER: Mildly distended.    PELVIC ORGANS: Unremarkable.    LYMPH NODES: No mesenteric or para-aortic lymphadenopathy.    BONES/SOFT TISSUES: Unremarkable.    AORTA/MAJOR BRANCHES: Unremarkable.    IMPRESSION:    Findings most consistent with enterocolitis with partial obstruction in the right lower quadrant of the abdomen may be due to focal small bowel wall thickening related to enteritis. Small abdominal pelvic ascites. Normal appendix.      ADILSON SONG MD; Attending Radiologist  This document has been electronically signed. May 11 2021 12:41AM    < end of copied text >

## 2021-05-13 LAB
ALBUMIN SERPL ELPH-MCNC: 2.6 G/DL — LOW (ref 3.5–5)
ALP SERPL-CCNC: 53 U/L — SIGNIFICANT CHANGE UP (ref 40–120)
ALT FLD-CCNC: 26 U/L DA — SIGNIFICANT CHANGE UP (ref 10–60)
ANION GAP SERPL CALC-SCNC: 9 MMOL/L — SIGNIFICANT CHANGE UP (ref 5–17)
AST SERPL-CCNC: 27 U/L — SIGNIFICANT CHANGE UP (ref 10–40)
BILIRUB SERPL-MCNC: 0.2 MG/DL — SIGNIFICANT CHANGE UP (ref 0.2–1.2)
BUN SERPL-MCNC: 7 MG/DL — SIGNIFICANT CHANGE UP (ref 7–18)
CALCIUM SERPL-MCNC: 8.6 MG/DL — SIGNIFICANT CHANGE UP (ref 8.4–10.5)
CHLORIDE SERPL-SCNC: 107 MMOL/L — SIGNIFICANT CHANGE UP (ref 96–108)
CO2 SERPL-SCNC: 26 MMOL/L — SIGNIFICANT CHANGE UP (ref 22–31)
CREAT SERPL-MCNC: 0.71 MG/DL — SIGNIFICANT CHANGE UP (ref 0.5–1.3)
GLUCOSE SERPL-MCNC: 99 MG/DL — SIGNIFICANT CHANGE UP (ref 70–99)
HCT VFR BLD CALC: 33.3 % — LOW (ref 34.5–45)
HGB BLD-MCNC: 10.6 G/DL — LOW (ref 11.5–15.5)
MAGNESIUM SERPL-MCNC: 2 MG/DL — SIGNIFICANT CHANGE UP (ref 1.6–2.6)
MCHC RBC-ENTMCNC: 29.6 PG — SIGNIFICANT CHANGE UP (ref 27–34)
MCHC RBC-ENTMCNC: 31.8 GM/DL — LOW (ref 32–36)
MCV RBC AUTO: 93 FL — SIGNIFICANT CHANGE UP (ref 80–100)
NRBC # BLD: 0 /100 WBCS — SIGNIFICANT CHANGE UP (ref 0–0)
PHOSPHATE SERPL-MCNC: 3.2 MG/DL — SIGNIFICANT CHANGE UP (ref 2.5–4.5)
PLATELET # BLD AUTO: 255 K/UL — SIGNIFICANT CHANGE UP (ref 150–400)
POTASSIUM SERPL-MCNC: 3.6 MMOL/L — SIGNIFICANT CHANGE UP (ref 3.5–5.3)
POTASSIUM SERPL-SCNC: 3.6 MMOL/L — SIGNIFICANT CHANGE UP (ref 3.5–5.3)
PROT SERPL-MCNC: 5.6 G/DL — LOW (ref 6–8.3)
RBC # BLD: 3.58 M/UL — LOW (ref 3.8–5.2)
RBC # FLD: 13.1 % — SIGNIFICANT CHANGE UP (ref 10.3–14.5)
SODIUM SERPL-SCNC: 142 MMOL/L — SIGNIFICANT CHANGE UP (ref 135–145)
WBC # BLD: 6.41 K/UL — SIGNIFICANT CHANGE UP (ref 3.8–10.5)
WBC # FLD AUTO: 6.41 K/UL — SIGNIFICANT CHANGE UP (ref 3.8–10.5)

## 2021-05-13 PROCEDURE — 99233 SBSQ HOSP IP/OBS HIGH 50: CPT | Mod: GC

## 2021-05-13 PROCEDURE — 99232 SBSQ HOSP IP/OBS MODERATE 35: CPT

## 2021-05-13 PROCEDURE — 74250 X-RAY XM SM INT 1CNTRST STD: CPT | Mod: 26

## 2021-05-13 RX ORDER — SODIUM CHLORIDE 9 MG/ML
1000 INJECTION, SOLUTION INTRAVENOUS
Refills: 0 | Status: DISCONTINUED | OUTPATIENT
Start: 2021-05-13 | End: 2021-05-14

## 2021-05-13 RX ADMIN — Medication 200 MILLIGRAM(S): at 05:46

## 2021-05-13 RX ADMIN — Medication 100 MILLIGRAM(S): at 13:19

## 2021-05-13 RX ADMIN — SIMVASTATIN 40 MILLIGRAM(S): 20 TABLET, FILM COATED ORAL at 21:03

## 2021-05-13 RX ADMIN — ONDANSETRON 4 MILLIGRAM(S): 8 TABLET, FILM COATED ORAL at 21:11

## 2021-05-13 RX ADMIN — Medication 650 MILLIGRAM(S): at 05:46

## 2021-05-13 RX ADMIN — Medication 650 MILLIGRAM(S): at 04:00

## 2021-05-13 RX ADMIN — GABAPENTIN 300 MILLIGRAM(S): 400 CAPSULE ORAL at 17:23

## 2021-05-13 RX ADMIN — ENOXAPARIN SODIUM 40 MILLIGRAM(S): 100 INJECTION SUBCUTANEOUS at 11:49

## 2021-05-13 RX ADMIN — Medication 5 MILLIGRAM(S): at 21:03

## 2021-05-13 RX ADMIN — Medication 100 MILLIGRAM(S): at 05:47

## 2021-05-13 RX ADMIN — FAMOTIDINE 40 MILLIGRAM(S): 10 INJECTION INTRAVENOUS at 11:49

## 2021-05-13 RX ADMIN — GABAPENTIN 300 MILLIGRAM(S): 400 CAPSULE ORAL at 05:46

## 2021-05-13 NOTE — PROGRESS NOTE ADULT - SUBJECTIVE AND OBJECTIVE BOX
PGY-1 Progress Note discussed with attending    PAGER #: [[phone]] TILL 5:00 PM  PLEASE CONTACT ON CALL TEAM:   - On Call Team (Please refer to Mj) FROM 5:00 PM - 8:30PM  - Nightfloat Team FROM 8:30 -7:30 AM    CHIEF COMPLAINT & BRIEF HOSPITAL COURSE:      INTERVAL HPI/OVERNIGHT EVENTS:       REVIEW OF SYSTEMS:  CONSTITUTIONAL: No fever, weight loss, or fatigue  RESPIRATORY: No cough, wheezing, chills or hemoptysis; No shortness of breath  CARDIOVASCULAR: No chest pain, palpitations, dizziness, or leg swelling  GASTROINTESTINAL: No abdominal pain. No nausea, vomiting, or hematemesis; No diarrhea or constipation. No melena or hematochezia.  GENITOURINARY: No dysuria or hematuria, urinary frequency  NEUROLOGICAL: No headaches, memory loss, loss of strength, numbness, or tremors  SKIN: No itching, burning, rashes, or lesions     MEDICATIONS  (STANDING):  ciprofloxacin   IVPB 400 milliGRAM(s) IV Intermittent every 12 hours  enoxaparin Injectable 40 milliGRAM(s) SubCutaneous daily  famotidine    Tablet 40 milliGRAM(s) Oral daily  gabapentin 300 milliGRAM(s) Oral two times a day  lactated ringers. 1000 milliLiter(s) (80 mL/Hr) IV Continuous <Continuous>  melatonin 5 milliGRAM(s) Oral at bedtime  metroNIDAZOLE  IVPB 500 milliGRAM(s) IV Intermittent every 8 hours  simvastatin 40 milliGRAM(s) Oral at bedtime    MEDICATIONS  (PRN):  acetaminophen   Tablet .. 650 milliGRAM(s) Oral every 6 hours PRN Mild Pain (1 - 3)  ondansetron Injectable 4 milliGRAM(s) IV Push every 4 hours PRN Nausea and/or Vomiting  oxycodone    5 mG/acetaminophen 325 mG 1 Tablet(s) Oral every 6 hours PRN Moderate Pain (4 - 6)      Vital Signs Last 24 Hrs  T(C): 36.8 (13 May 2021 05:05), Max: 36.8 (12 May 2021 20:05)  T(F): 98.2 (13 May 2021 05:05), Max: 98.3 (12 May 2021 20:05)  HR: 51 (13 May 2021 05:05) (51 - 66)  BP: 107/51 (13 May 2021 05:05) (107/51 - 128/64)  BP(mean): --  RR: 15 (13 May 2021 05:05) (15 - 18)  SpO2: 99% (13 May 2021 05:05) (95% - 99%)    PHYSICAL EXAMINATION:  GENERAL: NAD, well built  HEAD:  Atraumatic, Normocephalic  EYES:  conjunctiva and sclera clear  NECK: Supple, No JVD, Normal thyroid  CHEST/LUNG: Clear to auscultation. Clear to percussion bilaterally; No rales, rhonchi, wheezing, or rubs  HEART: Regular rate and rhythm; No murmurs, rubs, or gallops  ABDOMEN: Soft, Nontender, Nondistended; Bowel sounds present  NERVOUS SYSTEM:  Alert & Oriented X3,    EXTREMITIES:  2+ Peripheral Pulses, No clubbing, cyanosis, or edema  SKIN: warm dry                          10.6   6.41  )-----------( 255      ( 13 May 2021 08:13 )             33.3     05-13    142  |  107  |  7   ----------------------------<  99  3.6   |  26  |  0.71    Ca    8.6      13 May 2021 08:13  Phos  3.2     05-13  Mg     2.0     05-13    TPro  5.6<L>  /  Alb  2.6<L>  /  TBili  0.2  /  DBili  x   /  AST  27  /  ALT  26  /  AlkPhos  53  05-13    LIVER FUNCTIONS - ( 13 May 2021 08:13 )  Alb: 2.6 g/dL / Pro: 5.6 g/dL / ALK PHOS: 53 U/L / ALT: 26 U/L DA / AST: 27 U/L / GGT: x                     Culture - Urine (collected 11 May 2021 03:54)  Source: .Urine Clean Catch (Midstream)  Final Report (12 May 2021 00:32):    <10,000 CFU/mL Normal Urogenital Sunshine        I&O's Summary      CAPILLARY BLOOD GLUCOSE        CAPILLARY BLOOD GLUCOSE          RADIOLOGY & ADDITIONAL TESTS:                   PGY-1 Progress Note discussed with attending    PAGER #: [[phone]] TILL 5:00 PM  PLEASE CONTACT ON CALL TEAM:   - On Call Team (Please refer to Mj) FROM 5:00 PM - 8:30PM  - Nightfloat Team FROM 8:30 -7:30 AM    CHIEF COMPLAINT & BRIEF HOSPITAL COURSE:  ENTEROCOLITIS w/ partial OBSTRUCTION  65 F Gastritis, HLD, Pre-DM and neuropathy and PSHx of cholecystectomy and hysterectomy p/w diffuse sharp abdominal pain x1 day in LLQ and RLQ, 10/10 in intensity, non-radiating, associated with 3 episodes of watery diarrhea and NBNB  vomiting today. Pt denies any fever, chest pain, palpitations, shortness of breath, urinary sxs. In ED, /82, HR 76. CT a/p showed enterocolitis with partial obstruction in the right lower quadrant of the abdomen may be due to focal small bowel wall thickening related to enteritis. Small abdominal pelvic ascites, notably in the right paracolic gutter and cul-de-sac. WBC 16k. Made NPO. Started on Abx Cipro Flagyl. Surgery cons rec for NGT decompression if vomiting continues. GI cons Dr. Almeida.    INTERVAL HPI/OVERNIGHT EVENTS:   On clear liquid diet. Passing gas and has bowel sounds but is only taking in small amounts of diet. Pain improved. For small bowel series today.    REVIEW OF SYSTEMS:  CONSTITUTIONAL: No fever, weight loss, or fatigue  RESPIRATORY: No cough, wheezing, chills or hemoptysis; No shortness of breath  CARDIOVASCULAR: No chest pain, palpitations, dizziness, or leg swelling  GASTROINTESTINAL: No abdominal pain. No nausea, vomiting, or hematemesis; No diarrhea or constipation. No melena or hematochezia.  GENITOURINARY: No dysuria or hematuria, urinary frequency  NEUROLOGICAL: No headaches, memory loss, loss of strength, numbness, or tremors  SKIN: No itching, burning, rashes, or lesions     MEDICATIONS  (STANDING):  ciprofloxacin   IVPB 400 milliGRAM(s) IV Intermittent every 12 hours  enoxaparin Injectable 40 milliGRAM(s) SubCutaneous daily  famotidine    Tablet 40 milliGRAM(s) Oral daily  gabapentin 300 milliGRAM(s) Oral two times a day  lactated ringers. 1000 milliLiter(s) (80 mL/Hr) IV Continuous <Continuous>  melatonin 5 milliGRAM(s) Oral at bedtime  metroNIDAZOLE  IVPB 500 milliGRAM(s) IV Intermittent every 8 hours  simvastatin 40 milliGRAM(s) Oral at bedtime    MEDICATIONS  (PRN):  acetaminophen   Tablet .. 650 milliGRAM(s) Oral every 6 hours PRN Mild Pain (1 - 3)  ondansetron Injectable 4 milliGRAM(s) IV Push every 4 hours PRN Nausea and/or Vomiting  oxycodone    5 mG/acetaminophen 325 mG 1 Tablet(s) Oral every 6 hours PRN Moderate Pain (4 - 6)      Vital Signs Last 24 Hrs  T(C): 36.8 (13 May 2021 05:05), Max: 36.8 (12 May 2021 20:05)  T(F): 98.2 (13 May 2021 05:05), Max: 98.3 (12 May 2021 20:05)  HR: 51 (13 May 2021 05:05) (51 - 66)  BP: 107/51 (13 May 2021 05:05) (107/51 - 128/64)  BP(mean): --  RR: 15 (13 May 2021 05:05) (15 - 18)  SpO2: 99% (13 May 2021 05:05) (95% - 99%)    PHYSICAL EXAMINATION:  GENERAL: NAD, well built  HEAD:  Atraumatic, Normocephalic  EYES:  conjunctiva and sclera clear  NECK: Supple, No JVD, Normal thyroid  CHEST/LUNG: Clear to auscultation. Clear to percussion bilaterally; No rales, rhonchi, wheezing, or rubs  HEART: Regular rate and rhythm; No murmurs, rubs, or gallops  ABDOMEN: Soft, Nontender, Nondistended; Bowel sounds present  NERVOUS SYSTEM:  Alert & Oriented X3,    EXTREMITIES:  2+ Peripheral Pulses, No clubbing, cyanosis, or edema  SKIN: warm dry                          10.6   6.41  )-----------( 255      ( 13 May 2021 08:13 )             33.3     05-13    142  |  107  |  7   ----------------------------<  99  3.6   |  26  |  0.71    Ca    8.6      13 May 2021 08:13  Phos  3.2     05-13  Mg     2.0     05-13    TPro  5.6<L>  /  Alb  2.6<L>  /  TBili  0.2  /  DBili  x   /  AST  27  /  ALT  26  /  AlkPhos  53  05-13    LIVER FUNCTIONS - ( 13 May 2021 08:13 )  Alb: 2.6 g/dL / Pro: 5.6 g/dL / ALK PHOS: 53 U/L / ALT: 26 U/L DA / AST: 27 U/L / GGT: x                     Culture - Urine (collected 11 May 2021 03:54)  Source: .Urine Clean Catch (Midstream)  Final Report (12 May 2021 00:32):    <10,000 CFU/mL Normal Urogenital Sunshine        I&O's Summary      CAPILLARY BLOOD GLUCOSE        CAPILLARY BLOOD GLUCOSE          RADIOLOGY & ADDITIONAL TESTS:                   PGY-1 Progress Note discussed with attending    PAGER #: [[phone]] TILL 5:00 PM  PLEASE CONTACT ON CALL TEAM:   - On Call Team (Please refer to Mj) FROM 5:00 PM - 8:30PM  - Nightfloat Team FROM 8:30 -7:30 AM    CHIEF COMPLAINT & BRIEF HOSPITAL COURSE:  ENTEROCOLITIS w/ partial OBSTRUCTION  65 F Gastritis, HLD, Pre-DM and neuropathy and PSHx of cholecystectomy and hysterectomy p/w diffuse sharp abdominal pain x1 day in LLQ and RLQ, 10/10 in intensity, non-radiating, associated with 3 episodes of watery diarrhea and NBNB  vomiting today. Pt denies any fever, chest pain, palpitations, shortness of breath, urinary sxs. In ED, /82, HR 76. CT a/p showed enterocolitis with partial obstruction in the right lower quadrant of the abdomen may be due to focal small bowel wall thickening related to enteritis. Small abdominal pelvic ascites, notably in the right paracolic gutter and cul-de-sac. WBC 16k. Made NPO. Started on Abx Cipro Flagyl. Surgery cons rec for NGT decompression if vomiting continues. GI cons Dr. Almeida.    INTERVAL HPI/OVERNIGHT EVENTS:   On clear liquid diet. Passing gas and has bowel sounds but is only taking in small amounts of diet. Pain improved. For small bowel series today and MR MRCP on 5/14 24 hours after contrast administration.    REVIEW OF SYSTEMS:  CONSTITUTIONAL: No fever, weight loss, or fatigue  RESPIRATORY: No cough, wheezing, chills or hemoptysis; No shortness of breath  CARDIOVASCULAR: No chest pain, palpitations, dizziness, or leg swelling  GASTROINTESTINAL: + abdominal bloating. + abdominal pain resolving. + nausea, No vomiting, or hematemesis; No diarrhea or constipation. No melena or hematochezia.  GENITOURINARY: No dysuria or hematuria, urinary frequency  NEUROLOGICAL: No headaches, memory loss, loss of strength, numbness, or tremors  SKIN: No itching, burning, rashes, or lesions     MEDICATIONS  (STANDING):  ciprofloxacin   IVPB 400 milliGRAM(s) IV Intermittent every 12 hours  enoxaparin Injectable 40 milliGRAM(s) SubCutaneous daily  famotidine    Tablet 40 milliGRAM(s) Oral daily  gabapentin 300 milliGRAM(s) Oral two times a day  lactated ringers. 1000 milliLiter(s) (80 mL/Hr) IV Continuous <Continuous>  melatonin 5 milliGRAM(s) Oral at bedtime  metroNIDAZOLE  IVPB 500 milliGRAM(s) IV Intermittent every 8 hours  simvastatin 40 milliGRAM(s) Oral at bedtime    MEDICATIONS  (PRN):  acetaminophen   Tablet .. 650 milliGRAM(s) Oral every 6 hours PRN Mild Pain (1 - 3)  ondansetron Injectable 4 milliGRAM(s) IV Push every 4 hours PRN Nausea and/or Vomiting  oxycodone    5 mG/acetaminophen 325 mG 1 Tablet(s) Oral every 6 hours PRN Moderate Pain (4 - 6)      Vital Signs Last 24 Hrs  T(C): 36.8 (13 May 2021 05:05), Max: 36.8 (12 May 2021 20:05)  T(F): 98.2 (13 May 2021 05:05), Max: 98.3 (12 May 2021 20:05)  HR: 51 (13 May 2021 05:05) (51 - 66)  BP: 107/51 (13 May 2021 05:05) (107/51 - 128/64)  BP(mean): --  RR: 15 (13 May 2021 05:05) (15 - 18)  SpO2: 99% (13 May 2021 05:05) (95% - 99%)    PHYSICAL EXAMINATION:  GENERAL: NAD, on RA  HEAD:  Atraumatic, Normocephalic  EYES:  conjunctiva and sclera clear  NECK: Supple, No JVD,   CHEST/LUNG: Clear to auscultation. No rales, rhonchi, wheezing, or rubs  HEART: Regular rate and rhythm; No murmurs, rubs, or gallops  ABDOMEN: + marked distension, Soft, Nontender; normal to slight hyperactive Bowel sounds present in LLQ. Tender slightly in b/l lower abdomen  NERVOUS SYSTEM:  Alert & Oriented X3,    EXTREMITIES:  2+ Peripheral Pulses, No clubbing, cyanosis, or edema  SKIN: warm dry                          10.6   6.41  )-----------( 255      ( 13 May 2021 08:13 )             33.3     05-13    142  |  107  |  7   ----------------------------<  99  3.6   |  26  |  0.71    Ca    8.6      13 May 2021 08:13  Phos  3.2     05-13  Mg     2.0     05-13    TPro  5.6<L>  /  Alb  2.6<L>  /  TBili  0.2  /  DBili  x   /  AST  27  /  ALT  26  /  AlkPhos  53  05-13    LIVER FUNCTIONS - ( 13 May 2021 08:13 )  Alb: 2.6 g/dL / Pro: 5.6 g/dL / ALK PHOS: 53 U/L / ALT: 26 U/L DA / AST: 27 U/L / GGT: x           Culture - Urine (collected 11 May 2021 03:54)  Source: .Urine Clean Catch (Midstream)  Final Report (12 May 2021 00:32):    <10,000 CFU/mL Normal Urogenital Sunshine      RADIOLOGY & ADDITIONAL TESTS:    < from: CT Abdomen and Pelvis w/ IV Cont (05.10.21 @ 23:31) >    EXAM:  CT ABDOMEN AND PELVIS IC                            PROCEDURE DATE:  05/10/2021          INTERPRETATION:  CLINICAL INFORMATION: Diffuse abdominal pain.    COMPARISON: None.    TECHNIQUE: Axial CT of the abdomen and pelvis was performed afterthe administration of oral and intravenous contrast. Coronal and sagittal reformatted images were submitted.    CONTRAST/COMPLICATIONS:  IV Contrast: Omnipaque 350  90 cc administered   10 cc discarded  Oral Contrast: NONE  Complications: None reported at time of study completion    FINDINGS:    LOWER CHEST: Bibasilar dependent changes. No pleural effusion.    LIVER/GALLBLADDER: Scattered hepatic cysts and subcentimeter hypoenhancing foci, too small to characterize. Normal size with homogenous enhancement. Post cholecystectomy biliary duct dilatation. Small perihepatic ascites.    PANCREAS: No pancreatic ductal dilatation or peripancreatic fluid collection.    SPLEEN: Normal in size and enhancement.    KIDNEYS: No hydronephrosis. Tiny subcentimeter hypoenhancing focus in the upper pole the left kidney, too small to characterize.    ADRENAL GLANDS: Unremarkable.    BOWEL: Multiple prominent fluid-filled loops of small bowel with wall thickening and enhancement. Fecalized small bowel loop in the right lower quadrant of the abdomen with gradual tapering (series 2 images 93-97) Mild pancolonic wall thickening. Distended stomach with air-fluid level. Scattered colonic diverticulosis without diverticulitis. Normal appendix. No bowel obstruction or free air. Small abdominal pelvic ascites, notably in the right paracolic gutter and cul-de-sac.    URINARY BLADDER: Mildly distended.    PELVIC ORGANS: Unremarkable.    LYMPH NODES: No mesenteric or para-aortic lymphadenopathy.    BONES/SOFT TISSUES: Unremarkable.    AORTA/MAJOR BRANCHES: Unremarkable.    IMPRESSION:    Findings most consistent with enterocolitis with partial obstruction in the right lower quadrant of the abdomen may be due to focal small bowel wall thickening related to enteritis. Small abdominal pelvic ascites. Normal appendix.              ADILSON SONG MD; Attending Radiologist  This document has been electronically signed. May 11 2021 12:41AM    < end of copied text >                 PGY-1 Progress Note discussed with attending    PAGER #: [[phone]] TILL 5:00 PM  PLEASE CONTACT ON CALL TEAM:   - On Call Team (Please refer to Mj) FROM 5:00 PM - 8:30PM  - Nightfloat Team FROM 8:30 -7:30 AM    CHIEF COMPLAINT & BRIEF HOSPITAL COURSE:  ENTEROCOLITIS w/ partial OBSTRUCTION  65 F Gastritis, HLD, Pre-DM and neuropathy and PSHx of cholecystectomy and hysterectomy p/w diffuse sharp abdominal pain x1 day in LLQ and RLQ, 10/10 in intensity, non-radiating, associated with 3 episodes of watery diarrhea and NBNB  vomiting today. Pt denies any fever, chest pain, palpitations, shortness of breath, urinary sxs. In ED, /82, HR 76. CT a/p showed enterocolitis with partial obstruction in the right lower quadrant of the abdomen may be due to focal small bowel wall thickening related to enteritis. Small abdominal pelvic ascites, notably in the right paracolic gutter and cul-de-sac. WBC 16k. Made NPO. Started on Abx Cipro Flagyl. Surgery cons rec for NGT decompression if vomiting continues. GI cons Dr. Almeida.    INTERVAL HPI/OVERNIGHT EVENTS:   On clear liquid diet. Passing gas and has bowel sounds but is only taking in small amounts of diet. Pain improved. For small bowel series today and MR MRCP on 5/14 24 hours after contrast administration. Will send stool studies when has BM, none since admission    REVIEW OF SYSTEMS:  CONSTITUTIONAL: No fever, weight loss, or fatigue  RESPIRATORY: No cough, wheezing, chills or hemoptysis; No shortness of breath  CARDIOVASCULAR: No chest pain, palpitations, dizziness, or leg swelling  GASTROINTESTINAL: + abdominal bloating. + abdominal pain resolving. + nausea, No vomiting, or hematemesis; No diarrhea or constipation. No melena or hematochezia.  GENITOURINARY: No dysuria or hematuria, urinary frequency  NEUROLOGICAL: No headaches, memory loss, loss of strength, numbness, or tremors  SKIN: No itching, burning, rashes, or lesions     MEDICATIONS  (STANDING):  ciprofloxacin   IVPB 400 milliGRAM(s) IV Intermittent every 12 hours  enoxaparin Injectable 40 milliGRAM(s) SubCutaneous daily  famotidine    Tablet 40 milliGRAM(s) Oral daily  gabapentin 300 milliGRAM(s) Oral two times a day  lactated ringers. 1000 milliLiter(s) (80 mL/Hr) IV Continuous <Continuous>  melatonin 5 milliGRAM(s) Oral at bedtime  metroNIDAZOLE  IVPB 500 milliGRAM(s) IV Intermittent every 8 hours  simvastatin 40 milliGRAM(s) Oral at bedtime    MEDICATIONS  (PRN):  acetaminophen   Tablet .. 650 milliGRAM(s) Oral every 6 hours PRN Mild Pain (1 - 3)  ondansetron Injectable 4 milliGRAM(s) IV Push every 4 hours PRN Nausea and/or Vomiting  oxycodone    5 mG/acetaminophen 325 mG 1 Tablet(s) Oral every 6 hours PRN Moderate Pain (4 - 6)      Vital Signs Last 24 Hrs  T(C): 36.8 (13 May 2021 05:05), Max: 36.8 (12 May 2021 20:05)  T(F): 98.2 (13 May 2021 05:05), Max: 98.3 (12 May 2021 20:05)  HR: 51 (13 May 2021 05:05) (51 - 66)  BP: 107/51 (13 May 2021 05:05) (107/51 - 128/64)  BP(mean): --  RR: 15 (13 May 2021 05:05) (15 - 18)  SpO2: 99% (13 May 2021 05:05) (95% - 99%)    PHYSICAL EXAMINATION:  GENERAL: NAD, on RA  HEAD:  Atraumatic, Normocephalic  EYES:  conjunctiva and sclera clear  NECK: Supple, No JVD,   CHEST/LUNG: Clear to auscultation. No rales, rhonchi, wheezing, or rubs  HEART: Regular rate and rhythm; No murmurs, rubs, or gallops  ABDOMEN: + marked distension, Soft, Nontender; normal to slight hyperactive Bowel sounds present in LLQ. Tender slightly in b/l lower abdomen  NERVOUS SYSTEM:  Alert & Oriented X3,    EXTREMITIES:  2+ Peripheral Pulses, No clubbing, cyanosis, or edema  SKIN: warm dry                          10.6   6.41  )-----------( 255      ( 13 May 2021 08:13 )             33.3     05-13    142  |  107  |  7   ----------------------------<  99  3.6   |  26  |  0.71    Ca    8.6      13 May 2021 08:13  Phos  3.2     05-13  Mg     2.0     05-13    TPro  5.6<L>  /  Alb  2.6<L>  /  TBili  0.2  /  DBili  x   /  AST  27  /  ALT  26  /  AlkPhos  53  05-13    LIVER FUNCTIONS - ( 13 May 2021 08:13 )  Alb: 2.6 g/dL / Pro: 5.6 g/dL / ALK PHOS: 53 U/L / ALT: 26 U/L DA / AST: 27 U/L / GGT: x           Culture - Urine (collected 11 May 2021 03:54)  Source: .Urine Clean Catch (Midstream)  Final Report (12 May 2021 00:32):    <10,000 CFU/mL Normal Urogenital Sunshine      RADIOLOGY & ADDITIONAL TESTS:    < from: CT Abdomen and Pelvis w/ IV Cont (05.10.21 @ 23:31) >    EXAM:  CT ABDOMEN AND PELVIS IC                            PROCEDURE DATE:  05/10/2021          INTERPRETATION:  CLINICAL INFORMATION: Diffuse abdominal pain.    COMPARISON: None.    TECHNIQUE: Axial CT of the abdomen and pelvis was performed afterthe administration of oral and intravenous contrast. Coronal and sagittal reformatted images were submitted.    CONTRAST/COMPLICATIONS:  IV Contrast: Omnipaque 350  90 cc administered   10 cc discarded  Oral Contrast: NONE  Complications: None reported at time of study completion    FINDINGS:    LOWER CHEST: Bibasilar dependent changes. No pleural effusion.    LIVER/GALLBLADDER: Scattered hepatic cysts and subcentimeter hypoenhancing foci, too small to characterize. Normal size with homogenous enhancement. Post cholecystectomy biliary duct dilatation. Small perihepatic ascites.    PANCREAS: No pancreatic ductal dilatation or peripancreatic fluid collection.    SPLEEN: Normal in size and enhancement.    KIDNEYS: No hydronephrosis. Tiny subcentimeter hypoenhancing focus in the upper pole the left kidney, too small to characterize.    ADRENAL GLANDS: Unremarkable.    BOWEL: Multiple prominent fluid-filled loops of small bowel with wall thickening and enhancement. Fecalized small bowel loop in the right lower quadrant of the abdomen with gradual tapering (series 2 images 93-97) Mild pancolonic wall thickening. Distended stomach with air-fluid level. Scattered colonic diverticulosis without diverticulitis. Normal appendix. No bowel obstruction or free air. Small abdominal pelvic ascites, notably in the right paracolic gutter and cul-de-sac.    URINARY BLADDER: Mildly distended.    PELVIC ORGANS: Unremarkable.    LYMPH NODES: No mesenteric or para-aortic lymphadenopathy.    BONES/SOFT TISSUES: Unremarkable.    AORTA/MAJOR BRANCHES: Unremarkable.    IMPRESSION:    Findings most consistent with enterocolitis with partial obstruction in the right lower quadrant of the abdomen may be due to focal small bowel wall thickening related to enteritis. Small abdominal pelvic ascites. Normal appendix.              ADILSON SONG MD; Attending Radiologist  This document has been electronically signed. May 11 2021 12:41AM    < end of copied text >

## 2021-05-13 NOTE — PROGRESS NOTE ADULT - ASSESSMENT
65F with apparetn enterocolitis picture upon presentation. Has been passing flatus, but no BM. not tolerating clrs. Afebrile, nml VS and nml WBC (was 16 upon admission).      1) NPO  2) small bowel series  3) if vomits NGT  4) following

## 2021-05-13 NOTE — PROGRESS NOTE ADULT - PROBLEM SELECTOR PLAN 1
p/w diffuse abdominal pain and tenderness, nausea, vomiting and diarrhea since afternoon  WBC 16 K, afebrile  CT A/P shows enterocolitis with partial obstruction in the right lower quadrant of the abdomen may be due to focal small bowel wall thickening related to enteritis. Small abdominal pelvic ascites, notably in the right paracolic gutter and cul-de-sac.  NPO for now  c/w iv fluids  Started on Cipro and Flagyl  Zofran prn for nausea   Surgery consulted: Pt might need NGT decompression   GI Dr. Almeida p/w diffuse abdominal pain and tenderness, nausea, vomiting and diarrhea since afternoon  WBC 16 K, afebrile  CT A/P shows enterocolitis with partial obstruction in the right lower quadrant of the abdomen may be due to focal small bowel wall thickening related to enteritis. Small abdominal pelvic ascites, notably in the right paracolic gutter and cul-de-sac.  NPO for now  c/w iv fluids  Started on Cipro and Flagyl  Zofran prn for nausea   Surgery consulted: Pt might need NGT decompression   - - 1) NPO  - - 2) small bowel series  - - 3) if vomits NGT  - - 4) following  GI Dr. Almeida  -- cont NPO > now on clears  -- check GI PCR panel to r/o infectious etiology  -- cont IVF  -- DC empiric abx 5/13 per gi  -- can advance diet tomorrow to clear liquids p/w diffuse abdominal pain and tenderness, nausea, vomiting and diarrhea since afternoon  WBC 16 K, afebrile  CT A/P shows enterocolitis with partial obstruction in the right lower quadrant of the abdomen may be due to focal small bowel wall thickening related to enteritis. Small abdominal pelvic ascites, notably in the right paracolic gutter and cul-de-sac.  NPO for now  c/w iv fluids  Started on Cipro and Flagyl > DC'd per GI on 5/13  Zofran prn for nausea   Surgery consulted: Pt might need NGT decompression   - - 1) NPO  - - 2) small bowel series  - - 3) if vomits NGT  - - 4) following  GI Dr. Almeida  -- cont NPO > now on clears  -- check GI PCR panel to r/o infectious etiology  -- cont IVF  -- DC empiric abx 5/13 per gi  -- can advance diet tomorrow to clear liquids p/w diffuse abdominal pain and tenderness, nausea, vomiting and diarrhea since afternoon  WBC 16 K, afebrile  CT A/P shows enterocolitis with partial obstruction in the right lower quadrant of the abdomen may be due to focal small bowel wall thickening related to enteritis. Small abdominal pelvic ascites, notably in the right paracolic gutter and cul-de-sac.  NPO for now  c/w iv fluids  Started on Cipro and Flagyl > DC'd per GI on 5/13  Zofran prn for nausea   send stool studies when has BM, none since admission  Surgery consulted: Pt might need NGT decompression   - - 1) NPO  - - 2) small bowel series  - - 3) if vomits NGT  - - 4) following  GI Dr. Almeida  -- cont NPO > now on clears  -- check GI PCR panel to r/o infectious etiology  -- cont IVF  -- DC empiric abx 5/13 per gi  -- can advance diet tomorrow to clear liquids

## 2021-05-13 NOTE — PROGRESS NOTE ADULT - ATTENDING COMMENTS
Patient seen and examined on 5/13 morning. Case discussed with medical team.  Pt was not tolerating clear liquid diet. C/o nausea without vomiting. Exam significant for lower abdominal tenderness. Discussed with Dr. Gonsalez from surgery. Small bowel series was advised. no obstruction. rest of the care as outlined above

## 2021-05-13 NOTE — PROGRESS NOTE ADULT - SUBJECTIVE AND OBJECTIVE BOX
SUBJECTIVE    Nausea [X ] YES [ ] NO-when she tries to dirnk  Vomiting [ ] YES [X ] NO  Voiding normally [X ] YES [ ] NO  Flatus [X ] YES [ ] NO--normal amounts she reports  BM [ ] YES [X ] NO       Diarrhea [ ] YES [X ] NO  Pain under control [X ] YES [ ] NO--she reports that her overall pain is much improved since admission  not tolerating clears well  Ambulated [ X] YES NO [ ]        VITALS    ICU Vital Signs Last 24 Hrs  T(C): 36.8 (13 May 2021 05:05), Max: 36.8 (12 May 2021 20:05)  T(F): 98.2 (13 May 2021 05:05), Max: 98.3 (12 May 2021 20:05)  HR: 61 (13 May 2021 10:18) (51 - 66)  BP: 119/60 (13 May 2021 10:18) (107/51 - 128/64)  BP(mean): --  ABP: --  ABP(mean): --  RR: 17 (13 May 2021 10:18) (15 - 18)  SpO2: 97% (13 May 2021 10:18) (95% - 99%)    I&O's Detail        PHYSICAL EXAMINATION    Abdomen: soft, ND, very mildly tender to bilateral lower abdomen upon palpation    I&O's Summary      LABS                        10.6   6.41  )-----------( 255      ( 13 May 2021 08:13 )             33.3             05-13    142  |  107  |  7   ----------------------------<  99  3.6   |  26  |  0.71    Ca    8.6      13 May 2021 08:13  Phos  3.2     05-13  Mg     2.0     05-13    TPro  5.6<L>  /  Alb  2.6<L>  /  TBili  0.2  /  DBili  x   /  AST  27  /  ALT  26  /  AlkPhos  53  05-13    LIVER FUNCTIONS - ( 13 May 2021 08:13 )  Alb: 2.6 g/dL / Pro: 5.6 g/dL / ALK PHOS: 53 U/L / ALT: 26 U/L DA / AST: 27 U/L / GGT: x             MEDICATIONS:  MEDICATIONS  (STANDING):  ciprofloxacin   IVPB 400 milliGRAM(s) IV Intermittent every 12 hours  enoxaparin Injectable 40 milliGRAM(s) SubCutaneous daily  famotidine    Tablet 40 milliGRAM(s) Oral daily  gabapentin 300 milliGRAM(s) Oral two times a day  lactated ringers. 1000 milliLiter(s) (80 mL/Hr) IV Continuous <Continuous>  melatonin 5 milliGRAM(s) Oral at bedtime  metroNIDAZOLE  IVPB 500 milliGRAM(s) IV Intermittent every 8 hours  simvastatin 40 milliGRAM(s) Oral at bedtime    MEDICATIONS  (PRN):  acetaminophen   Tablet .. 650 milliGRAM(s) Oral every 6 hours PRN Mild Pain (1 - 3)  ondansetron Injectable 4 milliGRAM(s) IV Push every 4 hours PRN Nausea and/or Vomiting  oxycodone    5 mG/acetaminophen 325 mG 1 Tablet(s) Oral every 6 hours PRN Moderate Pain (4 - 6)

## 2021-05-14 DIAGNOSIS — K56.600 PARTIAL INTESTINAL OBSTRUCTION, UNSPECIFIED AS TO CAUSE: ICD-10-CM

## 2021-05-14 LAB
AFP-TM SERPL-MCNC: 3.9 NG/ML — SIGNIFICANT CHANGE UP
ALBUMIN SERPL ELPH-MCNC: 2.9 G/DL — LOW (ref 3.5–5)
ALP SERPL-CCNC: 57 U/L — SIGNIFICANT CHANGE UP (ref 40–120)
ALT FLD-CCNC: 42 U/L DA — SIGNIFICANT CHANGE UP (ref 10–60)
ANION GAP SERPL CALC-SCNC: 7 MMOL/L — SIGNIFICANT CHANGE UP (ref 5–17)
AST SERPL-CCNC: 73 U/L — HIGH (ref 10–40)
BASOPHILS # BLD AUTO: 0.03 K/UL — SIGNIFICANT CHANGE UP (ref 0–0.2)
BASOPHILS NFR BLD AUTO: 0.4 % — SIGNIFICANT CHANGE UP (ref 0–2)
BILIRUB SERPL-MCNC: 0.3 MG/DL — SIGNIFICANT CHANGE UP (ref 0.2–1.2)
BUN SERPL-MCNC: 8 MG/DL — SIGNIFICANT CHANGE UP (ref 7–18)
CALCIUM SERPL-MCNC: 8.6 MG/DL — SIGNIFICANT CHANGE UP (ref 8.4–10.5)
CHLORIDE SERPL-SCNC: 106 MMOL/L — SIGNIFICANT CHANGE UP (ref 96–108)
CO2 SERPL-SCNC: 27 MMOL/L — SIGNIFICANT CHANGE UP (ref 22–31)
CREAT SERPL-MCNC: 0.71 MG/DL — SIGNIFICANT CHANGE UP (ref 0.5–1.3)
EOSINOPHIL # BLD AUTO: 0.05 K/UL — SIGNIFICANT CHANGE UP (ref 0–0.5)
EOSINOPHIL NFR BLD AUTO: 0.7 % — SIGNIFICANT CHANGE UP (ref 0–6)
GLUCOSE SERPL-MCNC: 96 MG/DL — SIGNIFICANT CHANGE UP (ref 70–99)
HBV CORE AB SER-ACNC: REACTIVE
HBV SURFACE AB SER-ACNC: REACTIVE
HCT VFR BLD CALC: 35.1 % — SIGNIFICANT CHANGE UP (ref 34.5–45)
HGB BLD-MCNC: 11.4 G/DL — LOW (ref 11.5–15.5)
IMM GRANULOCYTES NFR BLD AUTO: 0.1 % — SIGNIFICANT CHANGE UP (ref 0–1.5)
LYMPHOCYTES # BLD AUTO: 3.03 K/UL — SIGNIFICANT CHANGE UP (ref 1–3.3)
LYMPHOCYTES # BLD AUTO: 44.6 % — HIGH (ref 13–44)
MAGNESIUM SERPL-MCNC: 1.9 MG/DL — SIGNIFICANT CHANGE UP (ref 1.6–2.6)
MCHC RBC-ENTMCNC: 29.9 PG — SIGNIFICANT CHANGE UP (ref 27–34)
MCHC RBC-ENTMCNC: 32.5 GM/DL — SIGNIFICANT CHANGE UP (ref 32–36)
MCV RBC AUTO: 92.1 FL — SIGNIFICANT CHANGE UP (ref 80–100)
MONOCYTES # BLD AUTO: 0.53 K/UL — SIGNIFICANT CHANGE UP (ref 0–0.9)
MONOCYTES NFR BLD AUTO: 7.8 % — SIGNIFICANT CHANGE UP (ref 2–14)
NEUTROPHILS # BLD AUTO: 3.14 K/UL — SIGNIFICANT CHANGE UP (ref 1.8–7.4)
NEUTROPHILS NFR BLD AUTO: 46.4 % — SIGNIFICANT CHANGE UP (ref 43–77)
NRBC # BLD: 0 /100 WBCS — SIGNIFICANT CHANGE UP (ref 0–0)
PHOSPHATE SERPL-MCNC: 3.7 MG/DL — SIGNIFICANT CHANGE UP (ref 2.5–4.5)
PLATELET # BLD AUTO: 284 K/UL — SIGNIFICANT CHANGE UP (ref 150–400)
POTASSIUM SERPL-MCNC: 4.2 MMOL/L — SIGNIFICANT CHANGE UP (ref 3.5–5.3)
POTASSIUM SERPL-SCNC: 4.2 MMOL/L — SIGNIFICANT CHANGE UP (ref 3.5–5.3)
PROT SERPL-MCNC: 6 G/DL — SIGNIFICANT CHANGE UP (ref 6–8.3)
RBC # BLD: 3.81 M/UL — SIGNIFICANT CHANGE UP (ref 3.8–5.2)
RBC # FLD: 12.8 % — SIGNIFICANT CHANGE UP (ref 10.3–14.5)
SODIUM SERPL-SCNC: 140 MMOL/L — SIGNIFICANT CHANGE UP (ref 135–145)
WBC # BLD: 6.79 K/UL — SIGNIFICANT CHANGE UP (ref 3.8–10.5)
WBC # FLD AUTO: 6.79 K/UL — SIGNIFICANT CHANGE UP (ref 3.8–10.5)

## 2021-05-14 PROCEDURE — 99232 SBSQ HOSP IP/OBS MODERATE 35: CPT

## 2021-05-14 PROCEDURE — 74183 MRI ABD W/O CNTR FLWD CNTR: CPT | Mod: 26

## 2021-05-14 PROCEDURE — 99233 SBSQ HOSP IP/OBS HIGH 50: CPT | Mod: GC

## 2021-05-14 RX ADMIN — Medication 650 MILLIGRAM(S): at 22:13

## 2021-05-14 RX ADMIN — FAMOTIDINE 40 MILLIGRAM(S): 10 INJECTION INTRAVENOUS at 12:40

## 2021-05-14 RX ADMIN — GABAPENTIN 300 MILLIGRAM(S): 400 CAPSULE ORAL at 05:08

## 2021-05-14 RX ADMIN — GABAPENTIN 300 MILLIGRAM(S): 400 CAPSULE ORAL at 17:37

## 2021-05-14 RX ADMIN — Medication 650 MILLIGRAM(S): at 04:00

## 2021-05-14 RX ADMIN — ENOXAPARIN SODIUM 40 MILLIGRAM(S): 100 INJECTION SUBCUTANEOUS at 12:40

## 2021-05-14 RX ADMIN — Medication 650 MILLIGRAM(S): at 03:22

## 2021-05-14 RX ADMIN — Medication 650 MILLIGRAM(S): at 14:30

## 2021-05-14 RX ADMIN — Medication 650 MILLIGRAM(S): at 21:43

## 2021-05-14 RX ADMIN — Medication 650 MILLIGRAM(S): at 12:41

## 2021-05-14 RX ADMIN — Medication 5 MILLIGRAM(S): at 21:43

## 2021-05-14 RX ADMIN — SIMVASTATIN 40 MILLIGRAM(S): 20 TABLET, FILM COATED ORAL at 21:44

## 2021-05-14 NOTE — PROGRESS NOTE ADULT - ASSESSMENT
This is a 65y Female with extensive medical Hx including prediabetes (HB A1c 6.1) , HLD, gastritis, GERD, neuropathy, s/p cholecystectomy, s/p hysterectomy presented on 5/11/21 with 1 days Hx of diffuse, sharp abdominal pain, with punctum maximum in RLQ and LLQ, a/w 3 episodes of watery diarrhea and NBNB vomiting, but no fever or chills, admitted to medicine for enterocolitis with partial SBO, being managed conservatively, on Cipro/Flagyl. Hepatology is consulted for incidentally found hepatic cysts.        # Few scattered hepatic cysts  - Reviewed imaging and discussed with radiology, the larger cysts (still small in size) are simple cysts, and the even smaller ones (tiny) are too small to characterize   - In case of simple cysts, no intervention needed unless symptomatic and in this case unlikely the source of the patients abdominal pain because the cysts are small     # Small subcentimeter hypoenhancing foci  - MRI for possible better characterization was done (b/o Hep B Hx), AFP wnl - 2 lesions in segment 6 measuring up to 1.2 cm, suggestive of hemangiomas    # Biliary dilation  - Likely post-cholecystectomy, but MRCP was done - 7.5 mm, no choledocholithiasis  - F/u with GI    # Prior Hep B exposure (HBcAb pos, HBsAb pos, HBsAg neg)   - In case patient gets immunosuppression in future, prophylactic therapy might be needed, thus to be aware    # Enterocolitis  - Pending GI PCR panel, stool culture, WBC, O/P, C. diff.   - Mgmt, per GI and surgery    Thank you fo the consult  Will continue to follow, Hepatology service off till Monday, please contact GI on call if any questions, concerns or change in status  D/w primary team

## 2021-05-14 NOTE — DIETITIAN INITIAL EVALUATION ADULT. - PERTINENT MEDS FT
MEDICATIONS  (STANDING):  enoxaparin Injectable 40 milliGRAM(s) SubCutaneous daily  famotidine    Tablet 40 milliGRAM(s) Oral daily  gabapentin 300 milliGRAM(s) Oral two times a day  lactated ringers. 1000 milliLiter(s) (80 mL/Hr) IV Continuous <Continuous>  melatonin 5 milliGRAM(s) Oral at bedtime  simvastatin 40 milliGRAM(s) Oral at bedtime    MEDICATIONS  (PRN):  acetaminophen   Tablet .. 650 milliGRAM(s) Oral every 6 hours PRN Mild Pain (1 - 3)  ondansetron Injectable 4 milliGRAM(s) IV Push every 4 hours PRN Nausea and/or Vomiting

## 2021-05-14 NOTE — DIETITIAN INITIAL EVALUATION ADULT. - PERTINENT LABORATORY DATA
05-14 Na140 mmol/L Glu 96 mg/dL K+ 4.2 mmol/L Cr  0.71 mg/dL BUN 8 mg/dL   05-14 Phos 3.7 mg/dL   05-14 Alb 2.9 g/dL<L>       05-11 Chol 145 mg/dL LDL --    HDL 74 mg/dL Trig 59 mg/dL  05-11-21 @ 09:42 HgbA1C 6.1 [4.0 - 5.6]

## 2021-05-14 NOTE — PROGRESS NOTE ADULT - ASSESSMENT
65F with improving enterocolitis    - advance diet as tolerated  - continue abx course  - pain control prn  - OOB ambulate  - f/u stool culture

## 2021-05-14 NOTE — PROGRESS NOTE ADULT - PROBLEM SELECTOR PLAN 3
Likely incidental finding, regardless cysts are simple  Hepatology was consulted  - Defer MR abdomen for now  - Hepatitis B studies Likely incidental finding, regardless cysts are simple  Hepatology was consulted  - MRCP pending, to further characterize cysts and also findings of post cholecystectomy biliary dilatation.  - Hepatitis B studies

## 2021-05-14 NOTE — PROGRESS NOTE ADULT - PROBLEM SELECTOR PLAN 1
Improved as demonstrated on small bowel series 5/13  Patient starting to tolerate clear liquid diet a little better  - Continue to observe for any signs vomiting, may require NGT for decompression  - Surgery following

## 2021-05-14 NOTE — DIETITIAN INITIAL EVALUATION ADULT. - OTHER INFO
Pt visited. Pt just arrived from MRI. Pt reports no BM . + Flatus.. Pt reports Appetite  being good. Prior to Hospitalization Pt C/O vomiting and Diarrhea X ~ 2 days. NKFA. Ht 5 feet 2 inches, UBW  120 lb. Pt  admitted w Partial SBO, Enterocolitis. On clear liquid diet at present tolerated. Pt visited. Pt just arrived from MRI. Pt reports no BM . + Flatus.. Pt reports Appetite  being good. Prior to Hospitalization Pt C/O vomiting and Diarrhea X ~ 2 days. NKFA. Ht 5 feet 2 inches, UBW  120 lb. Pt  admitted w Partial SBO, Enterocolitis. On clear liquid diet at present tolerated. Pt provided with diet education materials in [place] language.

## 2021-05-14 NOTE — PROGRESS NOTE ADULT - ASSESSMENT
65 year old female with PMHx of Gastritis, HLD, Pre-diabetes and neuropathy and PSHx of cholecystectomy and hysterectomy presents to the ED with complaints of abdominal pain since today afternoon admitted for further management of partial SBO and enterocolitis.

## 2021-05-14 NOTE — PROGRESS NOTE ADULT - ATTENDING COMMENTS
Patient seen and examined. Plan of care discussed with Dr. Herrmann.  This morning doing well. Able to tolerate liquid diet. Abdominal pain is better, however still with lower abdominal tenderness. No Bowel movements since admission. Small bowel series negative for any obstruction. Antibiotics were discontinued after discussion with Dr. Almeida. Discussed with Dr. Gonsalez of surgery recommended to advance diet as tolerated. Hepatic cysts on CT A/P likey simple cysts. Hepatology recommended MRCP to better characterize lesions. Rest of care as outlined above.    [facility]  ID #[id number]

## 2021-05-14 NOTE — PROGRESS NOTE ADULT - SUBJECTIVE AND OBJECTIVE BOX
Chief Complaint:  Patient is a 65y old  Female who presents with a chief complaint of Abdominal pain (14 May 2021 12:58)      Reason for consult:    Interval Events:     Hospital Medications:  acetaminophen   Tablet .. 650 milliGRAM(s) Oral every 6 hours PRN  enoxaparin Injectable 40 milliGRAM(s) SubCutaneous daily  famotidine    Tablet 40 milliGRAM(s) Oral daily  gabapentin 300 milliGRAM(s) Oral two times a day  melatonin 5 milliGRAM(s) Oral at bedtime  ondansetron Injectable 4 milliGRAM(s) IV Push every 4 hours PRN  simvastatin 40 milliGRAM(s) Oral at bedtime      ROS:   General:  No  fevers, chills, night sweats, fatigue  Eyes:  Good vision, no reported pain  ENT:  No sore throat, pain, runny nose  CV:  No pain, palpitations  Pulm:  No dyspnea, cough  GI:  See HPI, otherwise negative  :  No  incontinence, nocturia  Muscle:  No pain, weakness  Neuro:  No memory problems  Psych:  No insomnia, mood problems, depression  Endocrine:  No polyuria, polydipsia, cold/heat intolerance  Heme:  No petechiae, ecchymosis, easy bruisability  Skin:  No rash    PHYSICAL EXAM:   Vital Signs:  Vital Signs Last 24 Hrs  T(C): 36.6 (14 May 2021 14:25), Max: 36.9 (13 May 2021 20:30)  T(F): 97.8 (14 May 2021 14:25), Max: 98.4 (13 May 2021 20:30)  HR: 58 (14 May 2021 14:25) (58 - 62)  BP: 131/58 (14 May 2021 14:25) (125/45 - 131/58)  BP(mean): --  RR: 17 (14 May 2021 14:25) (17 - 17)  SpO2: 100% (14 May 2021 14:25) (99% - 100%)  Daily     Daily     GENERAL: no acute distress  NEURO: alert, no asterixis  HEENT: anicteric sclera, no conjunctival pallor appreciated  CHEST: no respiratory distress, no accessory muscle use  CARDIAC: regular rate, rhythm  ABDOMEN: soft, non-tender, non-distended, no rebound or guarding  EXTREMITIES: warm, well perfused, no edema  SKIN: no lesions noted    LABS: reviewed                        11.4   6.79  )-----------( 284      ( 14 May 2021 07:47 )             35.1     05-14    140  |  106  |  8   ----------------------------<  96  4.2   |  27  |  0.71    Ca    8.6      14 May 2021 07:47  Phos  3.7     05-14  Mg     1.9     05-14    TPro  6.0  /  Alb  2.9<L>  /  TBili  0.3  /  DBili  x   /  AST  73<H>  /  ALT  42  /  AlkPhos  57  05-14    LIVER FUNCTIONS - ( 14 May 2021 07:47 )  Alb: 2.9 g/dL / Pro: 6.0 g/dL / ALK PHOS: 57 U/L / ALT: 42 U/L DA / AST: 73 U/L / GGT: x             Interval Diagnostic Studies: see sunrise for full report   Chief Complaint:  Patient is a 65y old  Female who presents with a chief complaint of Abdominal pain (14 May 2021 12:58)      Reason for consult: Liver cysts    Interval Events: Patient was seen and examined at bedside. Had the MRI. Abdomen  periumbilical / RLQ and LLQ, as well as epigastric.  No vomiting, but still c/o nausea. No BM, passing  gas.    Hospital Medications:  acetaminophen   Tablet .. 650 milliGRAM(s) Oral every 6 hours PRN  enoxaparin Injectable 40 milliGRAM(s) SubCutaneous daily  famotidine    Tablet 40 milliGRAM(s) Oral daily  gabapentin 300 milliGRAM(s) Oral two times a day  melatonin 5 milliGRAM(s) Oral at bedtime  ondansetron Injectable 4 milliGRAM(s) IV Push every 4 hours PRN  simvastatin 40 milliGRAM(s) Oral at bedtime      ROS:   General:  No  fevers, chills  CV:  No pain, palpitations  Pulm:  No dyspnea, cough  GI:  see above  :  No dysuria  Muscle:  No pain, weakness  Neuro:  No memory problems  Heme:  No petechiae, ecchymosis, easy bruisability  Skin:  No rash    PHYSICAL EXAM:   Vital Signs:  Vital Signs Last 24 Hrs  T(C): 36.6 (14 May 2021 14:25), Max: 36.9 (13 May 2021 20:30)  T(F): 97.8 (14 May 2021 14:25), Max: 98.4 (13 May 2021 20:30)  HR: 58 (14 May 2021 14:25) (58 - 62)  BP: 131/58 (14 May 2021 14:25) (125/45 - 131/58)  BP(mean): --  RR: 17 (14 May 2021 14:25) (17 - 17)  SpO2: 100% (14 May 2021 14:25) (99% - 100%)  Daily     Daily     GENERAL: no acute distress  NEURO: AAOx3, no asterixis  HEENT: anicteric sclera, no conjunctival pallor appreciated  CHEST: no respiratory distress, no accessory muscle use  CARDIAC: regular rate, rhythm  ABDOMEN: soft, non-distended, tender periumbilically, LLQ, and somewhat epigastric and RLQ, no rebound or guarding, neg Callahan, BS+  EXTREMITIES: warm, well perfused, no edema  SKIN: no lesions noted    LABS: reviewed                        11.4   6.79  )-----------( 284      ( 14 May 2021 07:47 )             35.1     05-14    140  |  106  |  8   ----------------------------<  96  4.2   |  27  |  0.71    Ca    8.6      14 May 2021 07:47  Phos  3.7     05-14  Mg     1.9     05-14    TPro  6.0  /  Alb  2.9<L>  /  TBili  0.3  /  DBili  x   /  AST  73<H>  /  ALT  42  /  AlkPhos  57  05-14    LIVER FUNCTIONS - ( 14 May 2021 07:47 )  Alb: 2.9 g/dL / Pro: 6.0 g/dL / ALK PHOS: 57 U/L / ALT: 42 U/L DA / AST: 73 U/L / GGT: x             Interval Diagnostic Studies: see sunrise for full report

## 2021-05-14 NOTE — PROGRESS NOTE ADULT - PROBLEM SELECTOR PLAN 2
As demonstrated on CT abdomen.  Patient was started on antibiotics Cipro/Flagyl, however discontinued as it was reportedly suggested by GI but not documented.  - Continue to hold abx, follow up GI  - Attempted to obtain stool studies to rule out infectious etiology, however patient is not making any stool.

## 2021-05-14 NOTE — PROGRESS NOTE ADULT - SUBJECTIVE AND OBJECTIVE BOX
INTERVAL HPI/OVERNIGHT EVENTS:  Pt reports nausea last night that has improved. No vomiting. States abd pain is better. Passing flatus.    MEDICATIONS  (STANDING):  enoxaparin Injectable 40 milliGRAM(s) SubCutaneous daily  famotidine    Tablet 40 milliGRAM(s) Oral daily  gabapentin 300 milliGRAM(s) Oral two times a day  melatonin 5 milliGRAM(s) Oral at bedtime  simvastatin 40 milliGRAM(s) Oral at bedtime    MEDICATIONS  (PRN):  acetaminophen   Tablet .. 650 milliGRAM(s) Oral every 6 hours PRN Mild Pain (1 - 3)  ondansetron Injectable 4 milliGRAM(s) IV Push every 4 hours PRN Nausea and/or Vomiting    Vital Signs Last 24 Hrs  T(C): 36.8 (14 May 2021 20:02), Max: 37.1 (14 May 2021 19:17)  T(F): 98.3 (14 May 2021 20:02), Max: 98.7 (14 May 2021 19:17)  HR: 61 (14 May 2021 20:02) (58 - 61)  BP: 122/66 (14 May 2021 20:02) (117/60 - 131/58)  RR: 18 (14 May 2021 20:02) (17 - 18)  SpO2: 98% (14 May 2021 20:02) (98% - 100%)    Physical:  General: Alert and oriented, not in acute distress  Respiratory: Breathing unlabored  Abdomen: soft, nondistended, tender infraumbilical area; no rebound, no guarding    LABS:                      11.4   6.79  )-----------( 284      ( 14 May 2021 07:47 )             35.1             05-14    140  |  106  |  8   ----------------------------<  96  4.2   |  27  |  0.71    Ca    8.6      14 May 2021 07:47  Phos  3.7     05-14  Mg     1.9     05-14    TPro  6.0  /  Alb  2.9<L>  /  TBili  0.3  /  DBili  x   /  AST  73<H>  /  ALT  42  /  AlkPhos  57  05-14      < from: Xray Small Bowel Series (05.13.21 @ 18:42) >  INTERPRETATION:  Small bowel series  HISTORY: Enterocolitis with possible partial small bowel obstruction  COMPARISON: CT examination of 5/10/2021  A single contrast small bowel series was performed.  Interpretation: There is a small diverticulum of the second portion of the duodenum. The 15 minute film shows normal distribution and mucosal pattern of the proximal half of the small bowel. One-hour film shows contrast proceeding to the right lower quadrant. Additional film 30 minutes later shows contrast approaching the colon.  IMPRESSION: No evidence of enteritis or bowel obstruction.  Arrangements were made for delayed images with additional interpretations as needed.  < end of copied text >

## 2021-05-14 NOTE — PROGRESS NOTE ADULT - SUBJECTIVE AND OBJECTIVE BOX
PGY-2 Progress Note discussed with attending    PAGER #: [[phone]] TILL 5:00 PM  PLEASE CONTACT ON CALL TEAM:  - On Call Team (Please refer to Mj) FROM 5:00 PM - 8:30PM  - Nightfloat Team FROM 8:30 -7:30 AM    INTERVAL HPI/OVERNIGHT EVENTS:  Anjali #[id number]. Patient admitted with abdominal pain, found to have partial SBO with enterocolitis on CT abdomen. Admitted for further management, was kept NPO then diet advanced. On antibiotics, discontinued as per GI, noted with incidental hepatic cysts. Underwent small bowel series yesterday with adequate transit of contrast to the colon, hence no SBO.    Patient claims doing better this morning, is tolerating more of her clear liquid diet. Still has abdominal pain, however improved. Is passing flatus, however no bowel movements. Still has intermittent episodes of nausea, however no vomitus.      REVIEW OF SYSTEMS:  CONSTITUTIONAL: No fever, weight loss, or fatigue  RESPIRATORY: No cough, wheezing, chills or hemoptysis; No shortness of breath  CARDIOVASCULAR: No chest pain, palpitations, dizziness, or leg swelling  GASTROINTESTINAL: + abdominal pain, + nausea  GENITOURINARY: No dysuria or hematuria, urinary frequency  SKIN: No itching, burning, rashes, or lesions     Vital Signs Last 24 Hrs  T(C): 36.8 (14 May 2021 05:28), Max: 36.9 (13 May 2021 14:01)  T(F): 98.2 (14 May 2021 05:28), Max: 98.4 (13 May 2021 14:01)  HR: 59 (14 May 2021 05:28) (59 - 62)  BP: 125/45 (14 May 2021 05:28) (119/60 - 126/62)  BP(mean): --  RR: 17 (14 May 2021 05:28) (17 - 17)  SpO2: 100% (14 May 2021 05:28) (97% - 100%)    PHYSICAL EXAMINATION:  GENERAL: NAD, appears stated age, pleasant, AAox3  HEAD:  Atraumatic, Normocephalic  EYES:  conjunctiva and sclera clear  CHEST/LUNG: Clear to auscultation. Clear to percussion bilaterally; No rales, rhonchi, wheezing, or rubs  HEART: Regular rate and rhythm; No murmurs, rubs, or gallops  ABDOMEN: SOFT TENDER ON DEEP PALPATION NONDISTENDED, BOWEL SOUNDS PRESENT  NERVOUS SYSTEM:  Alert & Oriented X3,    EXTREMITIES:  2+ Peripheral Pulses, No clubbing, cyanosis, or edema  SKIN: warm dry                          11.4   6.79  )-----------( 284      ( 14 May 2021 07:47 )             35.1     05-14    140  |  106  |  8   ----------------------------<  96  4.2   |  27  |  0.71    Ca    8.6      14 May 2021 07:47  Phos  3.7     05-14  Mg     1.9     05-14    TPro  6.0  /  Alb  2.9<L>  /  TBili  0.3  /  DBili  x   /  AST  73<H>  /  ALT  42  /  AlkPhos  57  05-14    LIVER FUNCTIONS - ( 14 May 2021 07:47 )  Alb: 2.9 g/dL / Pro: 6.0 g/dL / ALK PHOS: 57 U/L / ALT: 42 U/L DA / AST: 73 U/L / GGT: x                   CAPILLARY BLOOD GLUCOSE      RADIOLOGY & ADDITIONAL TESTS:

## 2021-05-14 NOTE — DIETITIAN INITIAL EVALUATION ADULT. - PROBLEM SELECTOR PLAN 1
p/w diffuse abdominal pain and tenderness, nausea, vomiting and diarrhea since afternoon  WBC 16 K, afebrile  CT A/P shows enterocolitis with partial obstruction in the right lower quadrant of the abdomen may be due to focal small bowel wall thickening related to enteritis. Small abdominal pelvic ascites, notably in the right paracolic gutter and cul-de-sac.  NPO for now  c/w iv fluids  Started on Cipro and Flagyl  Zofran prn for nausea   Surgery consulted: Pt might need NGT decompression   GI Dr. Almeida

## 2021-05-14 NOTE — PROGRESS NOTE ADULT - PROBLEM SELECTOR PLAN 6
c/w gabapentin
RISK                                                          Points  [  ] Previous VTE                                                3  [  ] Thrombophilia                                             2  [  ] Lower limb paralysis                                   2        (unable to hold up >15 seconds)    [  ] Current Cancer                                             2         (within 6 months)  [ x ] Immobilization > 24 hrs                              1  [  ] ICU/CCU stay > 24 hours                             1  [ x ] Age > 60                                                         1    IMPROVE VTE Score: 2  lovenox for VTE prophylaxis.

## 2021-05-15 LAB
ALBUMIN SERPL ELPH-MCNC: 3.2 G/DL — LOW (ref 3.5–5)
ALP SERPL-CCNC: 67 U/L — SIGNIFICANT CHANGE UP (ref 40–120)
ALT FLD-CCNC: 91 U/L DA — HIGH (ref 10–60)
ANION GAP SERPL CALC-SCNC: 7 MMOL/L — SIGNIFICANT CHANGE UP (ref 5–17)
AST SERPL-CCNC: 164 U/L — HIGH (ref 10–40)
BASOPHILS # BLD AUTO: 0.03 K/UL — SIGNIFICANT CHANGE UP (ref 0–0.2)
BASOPHILS NFR BLD AUTO: 0.5 % — SIGNIFICANT CHANGE UP (ref 0–2)
BILIRUB SERPL-MCNC: 0.3 MG/DL — SIGNIFICANT CHANGE UP (ref 0.2–1.2)
BUN SERPL-MCNC: 9 MG/DL — SIGNIFICANT CHANGE UP (ref 7–18)
CALCIUM SERPL-MCNC: 9 MG/DL — SIGNIFICANT CHANGE UP (ref 8.4–10.5)
CHLORIDE SERPL-SCNC: 106 MMOL/L — SIGNIFICANT CHANGE UP (ref 96–108)
CO2 SERPL-SCNC: 28 MMOL/L — SIGNIFICANT CHANGE UP (ref 22–31)
CREAT SERPL-MCNC: 0.7 MG/DL — SIGNIFICANT CHANGE UP (ref 0.5–1.3)
CULTURE RESULTS: SIGNIFICANT CHANGE UP
EOSINOPHIL # BLD AUTO: 0.05 K/UL — SIGNIFICANT CHANGE UP (ref 0–0.5)
EOSINOPHIL NFR BLD AUTO: 0.8 % — SIGNIFICANT CHANGE UP (ref 0–6)
GLUCOSE SERPL-MCNC: 103 MG/DL — HIGH (ref 70–99)
HBV E AB SER-ACNC: SIGNIFICANT CHANGE UP
HBV E AG SER-ACNC: SIGNIFICANT CHANGE UP
HCT VFR BLD CALC: 37.5 % — SIGNIFICANT CHANGE UP (ref 34.5–45)
HGB BLD-MCNC: 11.9 G/DL — SIGNIFICANT CHANGE UP (ref 11.5–15.5)
IMM GRANULOCYTES NFR BLD AUTO: 0.2 % — SIGNIFICANT CHANGE UP (ref 0–1.5)
LYMPHOCYTES # BLD AUTO: 2.99 K/UL — SIGNIFICANT CHANGE UP (ref 1–3.3)
LYMPHOCYTES # BLD AUTO: 46.3 % — HIGH (ref 13–44)
MAGNESIUM SERPL-MCNC: 2 MG/DL — SIGNIFICANT CHANGE UP (ref 1.6–2.6)
MCHC RBC-ENTMCNC: 29.4 PG — SIGNIFICANT CHANGE UP (ref 27–34)
MCHC RBC-ENTMCNC: 31.7 GM/DL — LOW (ref 32–36)
MCV RBC AUTO: 92.6 FL — SIGNIFICANT CHANGE UP (ref 80–100)
MONOCYTES # BLD AUTO: 0.52 K/UL — SIGNIFICANT CHANGE UP (ref 0–0.9)
MONOCYTES NFR BLD AUTO: 8 % — SIGNIFICANT CHANGE UP (ref 2–14)
NEUTROPHILS # BLD AUTO: 2.86 K/UL — SIGNIFICANT CHANGE UP (ref 1.8–7.4)
NEUTROPHILS NFR BLD AUTO: 44.2 % — SIGNIFICANT CHANGE UP (ref 43–77)
NRBC # BLD: 0 /100 WBCS — SIGNIFICANT CHANGE UP (ref 0–0)
PHOSPHATE SERPL-MCNC: 4 MG/DL — SIGNIFICANT CHANGE UP (ref 2.5–4.5)
PLATELET # BLD AUTO: 310 K/UL — SIGNIFICANT CHANGE UP (ref 150–400)
POTASSIUM SERPL-MCNC: 4 MMOL/L — SIGNIFICANT CHANGE UP (ref 3.5–5.3)
POTASSIUM SERPL-SCNC: 4 MMOL/L — SIGNIFICANT CHANGE UP (ref 3.5–5.3)
PROT SERPL-MCNC: 6.4 G/DL — SIGNIFICANT CHANGE UP (ref 6–8.3)
RBC # BLD: 4.05 M/UL — SIGNIFICANT CHANGE UP (ref 3.8–5.2)
RBC # FLD: 12.6 % — SIGNIFICANT CHANGE UP (ref 10.3–14.5)
SODIUM SERPL-SCNC: 141 MMOL/L — SIGNIFICANT CHANGE UP (ref 135–145)
SPECIMEN SOURCE: SIGNIFICANT CHANGE UP
WBC # BLD: 6.46 K/UL — SIGNIFICANT CHANGE UP (ref 3.8–10.5)
WBC # FLD AUTO: 6.46 K/UL — SIGNIFICANT CHANGE UP (ref 3.8–10.5)

## 2021-05-15 PROCEDURE — 99233 SBSQ HOSP IP/OBS HIGH 50: CPT

## 2021-05-15 RX ORDER — FLUCONAZOLE 150 MG/1
200 TABLET ORAL ONCE
Refills: 0 | Status: COMPLETED | OUTPATIENT
Start: 2021-05-15 | End: 2021-05-15

## 2021-05-15 RX ORDER — POLYETHYLENE GLYCOL 3350 17 G/17G
17 POWDER, FOR SOLUTION ORAL DAILY
Refills: 0 | Status: DISCONTINUED | OUTPATIENT
Start: 2021-05-15 | End: 2021-05-16

## 2021-05-15 RX ADMIN — FAMOTIDINE 40 MILLIGRAM(S): 10 INJECTION INTRAVENOUS at 11:25

## 2021-05-15 RX ADMIN — POLYETHYLENE GLYCOL 3350 17 GRAM(S): 17 POWDER, FOR SOLUTION ORAL at 13:07

## 2021-05-15 RX ADMIN — GABAPENTIN 300 MILLIGRAM(S): 400 CAPSULE ORAL at 17:20

## 2021-05-15 RX ADMIN — SIMVASTATIN 40 MILLIGRAM(S): 20 TABLET, FILM COATED ORAL at 21:24

## 2021-05-15 RX ADMIN — FLUCONAZOLE 200 MILLIGRAM(S): 150 TABLET ORAL at 17:20

## 2021-05-15 RX ADMIN — Medication 650 MILLIGRAM(S): at 21:24

## 2021-05-15 RX ADMIN — Medication 650 MILLIGRAM(S): at 22:15

## 2021-05-15 RX ADMIN — ENOXAPARIN SODIUM 40 MILLIGRAM(S): 100 INJECTION SUBCUTANEOUS at 11:25

## 2021-05-15 RX ADMIN — Medication 5 MILLIGRAM(S): at 21:24

## 2021-05-15 RX ADMIN — GABAPENTIN 300 MILLIGRAM(S): 400 CAPSULE ORAL at 05:32

## 2021-05-15 NOTE — PROGRESS NOTE ADULT - ASSESSMENT
65 year old female with PMHx of Gastritis, HLD, Pre-diabetes and neuropathy and PSHx of cholecystectomy and hysterectomy presents to the ED with complaints of abdominal pain  admitted for further management of partial SBO and enterocolitis.  Still with some abdominal discomfort No BM since admission    Imp  abdominal pain secondary to partial SBO with enterocolitis  Nausea without vomiting  Partial SBO resolved  multiple hepatic cysts  vaginal itching    Advance diet as tolerated  Zofran as need for nausea  bowel regimen, monitor for BM  Diflucan x1    [facility]  ID# [id number]

## 2021-05-15 NOTE — PROGRESS NOTE ADULT - PROVIDER SPECIALTY LIST ADULT
Surgery
Internal Medicine
Surgery
Hepatology
Surgery
Surgery
Hospitalist
Internal Medicine
Internal Medicine

## 2021-05-15 NOTE — PROGRESS NOTE ADULT - SUBJECTIVE AND OBJECTIVE BOX
HPI:  65 year old female with PMHx of Gastritis, HLD, Pre-diabetes and neuropathy and PSHx of cholecystectomy and hysterectomy presents to the ED with complaints of abdominal pain since today afternoon. Pt reports diffuse abdominal pain, more pronounced in LLQ and RLQ, sharp in nature, 10/10 in intensity, non-radiating, associated with 3 episodes of watery diarrhea and NBNB  vomiting today. Pt denies any fever, chest pain, palpitations, shortness of breath, urinary symptoms or any other acute complaints.    In ED, /82, HR 76 (11 May 2021 03:39)      Patient is a 65y old  Female who presents with a chief complaint of Abdominal pain (14 May 2021 17:13)      INTERVAL HPI/OVERNIGHT EVENTS:  No     REVIEW OF SYSTEMS: denies fever, chills, SOB, palpitations, chest pain, abdominal pain, nausea, vomitting, diarrhea, constipation, dizziness    MEDICATIONS  (STANDING):  enoxaparin Injectable 40 milliGRAM(s) SubCutaneous daily  famotidine    Tablet 40 milliGRAM(s) Oral daily  gabapentin 300 milliGRAM(s) Oral two times a day  melatonin 5 milliGRAM(s) Oral at bedtime  simvastatin 40 milliGRAM(s) Oral at bedtime    MEDICATIONS  (PRN):  acetaminophen   Tablet .. 650 milliGRAM(s) Oral every 6 hours PRN Mild Pain (1 - 3)  ondansetron Injectable 4 milliGRAM(s) IV Push every 4 hours PRN Nausea and/or Vomiting      PHYSICAL EXAM:  GENERAL: NAD, well-groomed, well-developed  HEAD:  Atraumatic, Normocephalic  EYES: EOMI, PERRLA, conjunctiva and sclera clear  NECK: Supple, No JVD, Normal thyroid  NERVOUS SYSTEM:  Alert & Oriented X3  CHEST/LUNG: Clear to percussion bilaterally; No rales, rhonchi, wheezing, or rubs  HEART: Regular rate and rhythm; No murmurs, rubs, or gallops  ABDOMEN: Soft, mild tenderness on lower abdomen,+BS  EXTREMITIES:  2+ Peripheral Pulses, No clubbing, cyanosis, or edema  LYMPH: No lymphadenopathy noted  SKIN: No rashes or lesions    LABS:                        11.9   6.46  )-----------( 310      ( 15 May 2021 05:59 )             37.5     05-15    141  |  106  |  9   ----------------------------<  103<H>  4.0   |  28  |  0.70    Ca    9.0      15 May 2021 05:59  Phos  4.0     05-15  Mg     2.0     05-15    TPro  6.4  /  Alb  3.2<L>  /  TBili  0.3  /  DBili  x   /  AST  164<H>  /  ALT  91<H>  /  AlkPhos  67  05-15        CAPILLARY BLOOD GLUCOSE

## 2021-05-16 ENCOUNTER — TRANSCRIPTION ENCOUNTER (OUTPATIENT)
Age: 66
End: 2021-05-16

## 2021-05-16 VITALS
OXYGEN SATURATION: 98 % | DIASTOLIC BLOOD PRESSURE: 64 MMHG | SYSTOLIC BLOOD PRESSURE: 101 MMHG | TEMPERATURE: 98 F | RESPIRATION RATE: 17 BRPM | HEART RATE: 65 BPM

## 2021-05-16 LAB
ALBUMIN SERPL ELPH-MCNC: 3.3 G/DL — LOW (ref 3.5–5)
ALP SERPL-CCNC: 77 U/L — SIGNIFICANT CHANGE UP (ref 40–120)
ALT FLD-CCNC: 126 U/L DA — HIGH (ref 10–60)
ANION GAP SERPL CALC-SCNC: 12 MMOL/L — SIGNIFICANT CHANGE UP (ref 5–17)
AST SERPL-CCNC: 143 U/L — HIGH (ref 10–40)
BASOPHILS # BLD AUTO: 0.04 K/UL — SIGNIFICANT CHANGE UP (ref 0–0.2)
BASOPHILS NFR BLD AUTO: 0.6 % — SIGNIFICANT CHANGE UP (ref 0–2)
BILIRUB SERPL-MCNC: 0.2 MG/DL — SIGNIFICANT CHANGE UP (ref 0.2–1.2)
BUN SERPL-MCNC: 11 MG/DL — SIGNIFICANT CHANGE UP (ref 7–18)
CALCIUM SERPL-MCNC: 8.9 MG/DL — SIGNIFICANT CHANGE UP (ref 8.4–10.5)
CHLORIDE SERPL-SCNC: 104 MMOL/L — SIGNIFICANT CHANGE UP (ref 96–108)
CO2 SERPL-SCNC: 24 MMOL/L — SIGNIFICANT CHANGE UP (ref 22–31)
CREAT SERPL-MCNC: 0.88 MG/DL — SIGNIFICANT CHANGE UP (ref 0.5–1.3)
EOSINOPHIL # BLD AUTO: 0.03 K/UL — SIGNIFICANT CHANGE UP (ref 0–0.5)
EOSINOPHIL NFR BLD AUTO: 0.5 % — SIGNIFICANT CHANGE UP (ref 0–6)
GLUCOSE SERPL-MCNC: 213 MG/DL — HIGH (ref 70–99)
HCT VFR BLD CALC: 40.4 % — SIGNIFICANT CHANGE UP (ref 34.5–45)
HGB BLD-MCNC: 13.1 G/DL — SIGNIFICANT CHANGE UP (ref 11.5–15.5)
IMM GRANULOCYTES NFR BLD AUTO: 0.3 % — SIGNIFICANT CHANGE UP (ref 0–1.5)
LYMPHOCYTES # BLD AUTO: 2.5 K/UL — SIGNIFICANT CHANGE UP (ref 1–3.3)
LYMPHOCYTES # BLD AUTO: 39.5 % — SIGNIFICANT CHANGE UP (ref 13–44)
MAGNESIUM SERPL-MCNC: 1.8 MG/DL — SIGNIFICANT CHANGE UP (ref 1.6–2.6)
MCHC RBC-ENTMCNC: 29.4 PG — SIGNIFICANT CHANGE UP (ref 27–34)
MCHC RBC-ENTMCNC: 32.4 GM/DL — SIGNIFICANT CHANGE UP (ref 32–36)
MCV RBC AUTO: 90.8 FL — SIGNIFICANT CHANGE UP (ref 80–100)
MONOCYTES # BLD AUTO: 0.3 K/UL — SIGNIFICANT CHANGE UP (ref 0–0.9)
MONOCYTES NFR BLD AUTO: 4.7 % — SIGNIFICANT CHANGE UP (ref 2–14)
NEUTROPHILS # BLD AUTO: 3.44 K/UL — SIGNIFICANT CHANGE UP (ref 1.8–7.4)
NEUTROPHILS NFR BLD AUTO: 54.4 % — SIGNIFICANT CHANGE UP (ref 43–77)
NRBC # BLD: 0 /100 WBCS — SIGNIFICANT CHANGE UP (ref 0–0)
PHOSPHATE SERPL-MCNC: 4 MG/DL — SIGNIFICANT CHANGE UP (ref 2.5–4.5)
PLATELET # BLD AUTO: 332 K/UL — SIGNIFICANT CHANGE UP (ref 150–400)
POTASSIUM SERPL-MCNC: 3.5 MMOL/L — SIGNIFICANT CHANGE UP (ref 3.5–5.3)
POTASSIUM SERPL-SCNC: 3.5 MMOL/L — SIGNIFICANT CHANGE UP (ref 3.5–5.3)
PROT SERPL-MCNC: 7.3 G/DL — SIGNIFICANT CHANGE UP (ref 6–8.3)
RBC # BLD: 4.45 M/UL — SIGNIFICANT CHANGE UP (ref 3.8–5.2)
RBC # FLD: 12.8 % — SIGNIFICANT CHANGE UP (ref 10.3–14.5)
SODIUM SERPL-SCNC: 140 MMOL/L — SIGNIFICANT CHANGE UP (ref 135–145)
WBC # BLD: 6.33 K/UL — SIGNIFICANT CHANGE UP (ref 3.8–10.5)
WBC # FLD AUTO: 6.33 K/UL — SIGNIFICANT CHANGE UP (ref 3.8–10.5)

## 2021-05-16 PROCEDURE — 86704 HEP B CORE ANTIBODY TOTAL: CPT

## 2021-05-16 PROCEDURE — 81001 URINALYSIS AUTO W/SCOPE: CPT

## 2021-05-16 PROCEDURE — 99285 EMERGENCY DEPT VISIT HI MDM: CPT

## 2021-05-16 PROCEDURE — 85027 COMPLETE CBC AUTOMATED: CPT

## 2021-05-16 PROCEDURE — 80061 LIPID PANEL: CPT

## 2021-05-16 PROCEDURE — 85025 COMPLETE CBC W/AUTO DIFF WBC: CPT

## 2021-05-16 PROCEDURE — 86769 SARS-COV-2 COVID-19 ANTIBODY: CPT

## 2021-05-16 PROCEDURE — 83735 ASSAY OF MAGNESIUM: CPT

## 2021-05-16 PROCEDURE — 80074 ACUTE HEPATITIS PANEL: CPT

## 2021-05-16 PROCEDURE — 36415 COLL VENOUS BLD VENIPUNCTURE: CPT

## 2021-05-16 PROCEDURE — 87045 FECES CULTURE AEROBIC BACT: CPT

## 2021-05-16 PROCEDURE — 87635 SARS-COV-2 COVID-19 AMP PRB: CPT

## 2021-05-16 PROCEDURE — 87517 HEPATITIS B DNA QUANT: CPT

## 2021-05-16 PROCEDURE — 82962 GLUCOSE BLOOD TEST: CPT

## 2021-05-16 PROCEDURE — 83690 ASSAY OF LIPASE: CPT

## 2021-05-16 PROCEDURE — 82105 ALPHA-FETOPROTEIN SERUM: CPT

## 2021-05-16 PROCEDURE — 87350 HEPATITIS BE AG IA: CPT

## 2021-05-16 PROCEDURE — 87177 OVA AND PARASITES SMEARS: CPT

## 2021-05-16 PROCEDURE — 86682 HELMINTH ANTIBODY: CPT

## 2021-05-16 PROCEDURE — 84443 ASSAY THYROID STIM HORMONE: CPT

## 2021-05-16 PROCEDURE — 86803 HEPATITIS C AB TEST: CPT

## 2021-05-16 PROCEDURE — 87086 URINE CULTURE/COLONY COUNT: CPT

## 2021-05-16 PROCEDURE — 86706 HEP B SURFACE ANTIBODY: CPT

## 2021-05-16 PROCEDURE — 86707 HEPATITIS BE ANTIBODY: CPT

## 2021-05-16 PROCEDURE — 86753 PROTOZOA ANTIBODY NOS: CPT

## 2021-05-16 PROCEDURE — 74177 CT ABD & PELVIS W/CONTRAST: CPT

## 2021-05-16 PROCEDURE — 84100 ASSAY OF PHOSPHORUS: CPT

## 2021-05-16 PROCEDURE — 83036 HEMOGLOBIN GLYCOSYLATED A1C: CPT

## 2021-05-16 PROCEDURE — 87046 STOOL CULTR AEROBIC BACT EA: CPT

## 2021-05-16 PROCEDURE — 80053 COMPREHEN METABOLIC PANEL: CPT

## 2021-05-16 PROCEDURE — 82977 ASSAY OF GGT: CPT

## 2021-05-16 PROCEDURE — 99239 HOSP IP/OBS DSCHRG MGMT >30: CPT

## 2021-05-16 PROCEDURE — 83605 ASSAY OF LACTIC ACID: CPT

## 2021-05-16 PROCEDURE — 74250 X-RAY XM SM INT 1CNTRST STD: CPT

## 2021-05-16 PROCEDURE — 74183 MRI ABD W/O CNTR FLWD CNTR: CPT

## 2021-05-16 PROCEDURE — 87507 IADNA-DNA/RNA PROBE TQ 12-25: CPT

## 2021-05-16 RX ORDER — SIMETHICONE 80 MG/1
1 TABLET, CHEWABLE ORAL
Qty: 60 | Refills: 0
Start: 2021-05-16 | End: 2021-06-14

## 2021-05-16 RX ORDER — SIMETHICONE 80 MG/1
80 TABLET, CHEWABLE ORAL
Refills: 0 | Status: DISCONTINUED | OUTPATIENT
Start: 2021-05-16 | End: 2021-05-16

## 2021-05-16 RX ORDER — PANTOPRAZOLE SODIUM 20 MG/1
1 TABLET, DELAYED RELEASE ORAL
Qty: 30 | Refills: 0
Start: 2021-05-16 | End: 2021-06-14

## 2021-05-16 RX ADMIN — POLYETHYLENE GLYCOL 3350 17 GRAM(S): 17 POWDER, FOR SOLUTION ORAL at 11:57

## 2021-05-16 RX ADMIN — FAMOTIDINE 40 MILLIGRAM(S): 10 INJECTION INTRAVENOUS at 11:57

## 2021-05-16 RX ADMIN — GABAPENTIN 300 MILLIGRAM(S): 400 CAPSULE ORAL at 05:47

## 2021-05-16 RX ADMIN — SIMETHICONE 80 MILLIGRAM(S): 80 TABLET, CHEWABLE ORAL at 05:47

## 2021-05-16 RX ADMIN — ENOXAPARIN SODIUM 40 MILLIGRAM(S): 100 INJECTION SUBCUTANEOUS at 11:57

## 2021-05-16 NOTE — DISCHARGE NOTE PROVIDER - ATTENDING COMMENTS
Patient seen and examined. Case discussed with Dr. Gonzalez.   Doing well this morning. Tolerating diet. Had bowel movement yesterday. Pt is medically stable to discharge with outpatient GI follow up.    Imp  Abdominal pain secondary to partial SBO with enterocolitis -resolved  Nausea without vomiting resolved  Partial SBO resolved  Multiple hepatic cysts s/p MRCP  vaginal itching s/p diflucan  Prior Hep B exposure (HBcAb pos, HBsAb pos, HBsAg neg) - In case patient gets immunosuppression in future, prophylactic therapy might be needed, thus to be aware

## 2021-05-16 NOTE — DISCHARGE NOTE PROVIDER - NSDCMRMEDTOKEN_GEN_ALL_CORE_FT
Combivent Respimat CFC free 20 mcg-100 mcg/inh inhalation aerosol: 1 puff(s) inhaled 4 times a day, As Needed  famotidine 40 mg oral tablet: 1 tab(s) orally once a day (at bedtime)  gabapentin 300 mg oral capsule: 1 cap(s) orally 2 times a day  oxyCODONE 5 mg oral tablet: 1 tab(s) orally every 4 hours, As Needed for pain  pantoprazole 20 mg oral delayed release tablet: 1 tab(s) orally once a day  simvastatin 20 mg oral tablet: 1 tab(s) orally once a day (at bedtime)  simvastatin 40 mg oral tablet: 1 tab(s) orally once a day (at bedtime)   Combivent Respimat CFC free 20 mcg-100 mcg/inh inhalation aerosol: 1 puff(s) inhaled 4 times a day, As Needed  famotidine 40 mg oral tablet: 1 tab(s) orally once a day (at bedtime)  gabapentin 300 mg oral capsule: 1 cap(s) orally 2 times a day  simvastatin 40 mg oral tablet: 1 tab(s) orally once a day (at bedtime)   clotrimazole 2% vaginal cream with applicator: 1 applicatorful vaginal once a day (at bedtime)  Combivent Respimat CFC free 20 mcg-100 mcg/inh inhalation aerosol: 1 puff(s) inhaled 4 times a day, As Needed  famotidine 40 mg oral tablet: 1 tab(s) orally once a day (at bedtime)  gabapentin 300 mg oral capsule: 1 cap(s) orally 2 times a day  pantoprazole 20 mg oral delayed release tablet: 1 tab(s) orally once a day  simethicone 80 mg oral tablet, chewable: 1 tab(s) orally 2 times a day, As needed, Gas  simvastatin 40 mg oral tablet: 1 tab(s) orally once a day (at bedtime)

## 2021-05-16 NOTE — DISCHARGE NOTE PROVIDER - ATTENDING SUPERVISION STATEMENT
Resident Attending Supervision Statement: I have personally seen and examined the patient.  I fully participated in the care of this patient.  I have made amendments to the documentation where necessary, and agree with the history, physical exam, and plan as documented by the

## 2021-05-16 NOTE — DISCHARGE NOTE PROVIDER - CARE PROVIDER_API CALL
Gilmer Almeida  GASTROENTEROLOGY  [phone] [place] [id number]  Phone: [phone]  Fax: [phone]  Follow Up Time: 1 week

## 2021-05-16 NOTE — DISCHARGE NOTE PROVIDER - HOSPITAL COURSE
65 F Gastritis, HLD, Pre-DM and neuropathy and PSHx of cholecystectomy and hysterectomy p/w diffuse sharp abdominal pain x1 day in LLQ and RLQ, 10/10 in intensity, non-radiating, associated with 3 episodes of watery diarrhea and NBNB  vomiting today. Pt denies any fever, chest pain, palpitations, shortness of breath, urinary sxs. In ED, /82, HR 76. CT a/p showed enterocolitis with partial obstruction in the right lower quadrant of the abdomen may be due to focal small bowel wall thickening related to enteritis. Small abdominal pelvic ascites, notably in the right paracolic gutter and cul-de-sac. WBC 16k. Made NPO. Started on Abx Cipro Flagyl. Surgery consulted and did not place NG tube, made recs for NGT decompression if vomiting continues or distension worsens. GI was consulted, Dr. Almeida. Started on clear liquid diet.     INTERVAL HPI/OVERNIGHT EVENTS:   On clear liquid diet. Passing gas and has bowel sounds but is only taking in small amounts of diet. Pain improved. For small bowel series today and MR MRCP on 5/14 24 hours after contrast administration. Will send stool studies when has BM, none since admission    abdominal pain, found to have partial SBO with enterocolitis on CT abdomen. Admitted for further management, was kept NPO then diet advanced. On antibiotics, discontinued as per GI, noted with incidental hepatic cysts. Underwent small bowel series yesterday with adequate transit of contrast to the colon, hence no SBO.    Patient claims doing better this morning, is tolerating more of her clear liquid diet. Still has abdominal pain, however improved. Is passing flatus, however no bowel movements. Still has intermittent episodes of nausea, however no vomitus. 65 F Gastritis, HLD, Pre-DM and neuropathy and PSHx of cholecystectomy and hysterectomy p/w diffuse sharp abdominal pain x1 day in LLQ and RLQ, 10/10 in intensity, non-radiating, associated with 3 episodes of watery diarrhea and NBNB  vomiting today. Pt denies any fever, chest pain, palpitations, shortness of breath, urinary sxs. In ED, /82, HR 76. CT a/p showed enterocolitis with partial obstruction in the right lower quadrant of the abdomen may be due to focal small bowel wall thickening related to enteritis. Small abdominal pelvic ascites, notably in the right paracolic gutter and cul-de-sac. WBC 16k. Made NPO. Started on Abx Cipro Flagyl. Surgery consulted and did not place NG tube, made recs for NGT decompression if vomiting continues or distension worsens. GI was consulted, Dr. Almeida. Started on clear liquid diet.     INTERVAL HPI/OVERNIGHT EVENTS:   On clear liquid diet. Passing gas and has bowel sounds but is only taking in small amounts of diet. Pain improved. For small bowel series today and MR MRCP on 5/14 24 hours after contrast administration. Will send stool studies when has BM, none since admission    abdominal pain, found to have partial SBO with enterocolitis on CT abdomen. Admitted for further management, was kept NPO then diet advanced. On antibiotics, discontinued as per GI, noted with incidental hepatic cysts. Underwent small bowel series yesterday with adequate transit of contrast to the colon, hence no SBO.   65 F Gastritis, HLD, Pre-DM and neuropathy and PSHx of cholecystectomy and hysterectomy p/w diffuse sharp abdominal pain x1 day in LLQ and RLQ, 10/10 in intensity, non-radiating, associated with 3 episodes of watery diarrhea and NBNB  vomiting today. Pt denies any fever, chest pain, palpitations, shortness of breath, urinary sxs. In ED, /82, HR 76. CT a/p showed enterocolitis with partial obstruction in the right lower quadrant of the abdomen may be due to focal small bowel wall thickening related to enteritis. Small abdominal pelvic ascites, notably in the right paracolic gutter and cul-de-sac. WBC 16k. Made NPO. Started on Abx Cipro Flagyl. Surgery consulted and did not place NG tube, made recs for NGT decompression if vomiting continues or distension worsens. GI was consulted, Dr. Almeida. Started on clear liquid diet. Passing gas and has bowel sounds. Small bowel series 5/13 showed no signs of obstruction, mild enteritis of illeum. MR MRCP on 5/14 was ordered for hepatic cysts in setting of chronic hep B. MR showed small hepatic cysts and hemangiomas. CBD mild dilation 7.5mm 2/2 post cholecystectomy status. NO choledocholithiasis. Small bowel dilation RESOLVED. Patient had normal BM on 5/15, stool studies negative. Tolerating regular diet. For DC home with PCP and GI follow-up.     Given patient's improved clinical status and current hemodynamic stability, decision was made to discharge. Discussed with attending. Please refer to patient's complete medical chart with documents for a full hospital course, for this is only a brief summary.

## 2021-05-16 NOTE — DISCHARGE NOTE NURSING/CASE MANAGEMENT/SOCIAL WORK - PATIENT PORTAL LINK FT
You can access the FollowMyHealth Patient Portal offered by [facility] by registering at the following website: [email or web address]. By joining [facility]’s FollowMyHealth portal, you will also be able to view your health information using other applications (apps) compatible with our system.

## 2021-05-16 NOTE — DISCHARGE NOTE PROVIDER - NSDCCPCAREPLAN_GEN_ALL_CORE_FT
PRINCIPAL DISCHARGE DIAGNOSIS  Diagnosis: Enterocolitis  Assessment and Plan of Treatment: You presented with abdominal pain and diarrhea. A CT scan of your abdomen and pelvis showed enterocolitis with concern for partial obstruction in the right lower quadrant of the abdomen may be due to focal small bowel wall thickening related to enteritis. Small abdominal pelvic ascites, notably in the right paracolic gutter and cul-de-sac. We held your diet and started IV fluids and IV antibiotics Cipro Flagyl. Surgery consulted and and they did not place an NG tube. They moinitored you for clinical worsening should you have needed a nasogastric tube. GI was consulted, Dr. Almeida and you were started on  a clear liquid diet. X-ray with oral contrast Small bowel series on 5/13 showed no signs of obstruction and mild enteritis of illeum. MR MRCP on 5/14 was ordered for hepatic cysts in setting of chronic hep B. MR showed small hepatic cysts and hemangiomas. CBD mild dilation 7.5mm 2/2 post cholecystectomy status. NO choledocholithiasis. Small bowel dilation RESOLVED. You had a normal bowel movement on 5/15 and we sent stool studies which were negative. You tolerated regular diet. Please follow up with your primary care physician and gastroenterologist. You may need a repeat EGD (upper endoscopy) for your history of gastritis and abdominal polyps.      SECONDARY DISCHARGE DIAGNOSES  Diagnosis: Gastritis  Assessment and Plan of Treatment: Gastritis is a general term for a group of conditions with one thing in common: inflammation of the lining of the stomach. The inflammation of gastritis is most often the result of infection with the same bacterium (Helicobacter pylori) that causes most stomach ulcers.  Causes include infection, injury, regular use of pain pills called NSAIDs, and too much alcohol. Symptoms include upper belly pain, nausea, and vomiting. Sometimes, there are no symptoms.  Treatment depends on the cause. Antibiotics and antacids might help.  Gastroentereology made recommendations for you to continue antacid medications. Please follow up with your gastroenterologist as an outpatient.    Diagnosis: Hepatic cyst  Assessment and Plan of Treatment: Hepatic cysts were seen on your CT scan and MRI. You have a history of chronic hepatitis B infection. Hepatology was consulted and you are to follow up with your primary doctor. These are characterized as simple cysts and no intervention unless they cause symptoms.     PRINCIPAL DISCHARGE DIAGNOSIS  Diagnosis: Enterocolitis  Assessment and Plan of Treatment: You presented with abdominal pain and diarrhea. A CT scan of your abdomen and pelvis showed enterocolitis with concern for partial obstruction in the right lower quadrant of the abdomen may be due to focal small bowel wall thickening related to enteritis. Small abdominal pelvic ascites, notably in the right paracolic gutter and cul-de-sac. We held your diet and started IV fluids and IV antibiotics Cipro Flagyl. Surgery consulted and and they did not place an NG tube. They moinitored you for clinical worsening should you have needed a nasogastric tube. GI was consulted, Dr. Almeida and you were started on  a clear liquid diet. X-ray with oral contrast Small bowel series on 5/13 showed no signs of obstruction and mild enteritis of illeum. MR MRCP on 5/14 was ordered for hepatic cysts in setting of chronic hep B. MR showed small hepatic cysts and hemangiomas. CBD mild dilation 7.5mm 2/2 post cholecystectomy status. NO choledocholithiasis. Small bowel dilation RESOLVED. You had a normal bowel movement on 5/15 and we sent stool studies which were negative. You tolerated regular diet. Please follow up with your primary care physician and gastroenterologist. You may need a repeat EGD (upper endoscopy) for your history of gastritis and abdominal polyps. you may follow up with your gastroenterologist or Dr. Juan Pablo Evans.      SECONDARY DISCHARGE DIAGNOSES  Diagnosis: Gastritis  Assessment and Plan of Treatment: Gastritis is a general term for a group of conditions with one thing in common: inflammation of the lining of the stomach. The inflammation of gastritis is most often the result of infection with the same bacterium (Helicobacter pylori) that causes most stomach ulcers.  Causes include infection, injury, regular use of pain pills called NSAIDs, and too much alcohol. Symptoms include upper belly pain, nausea, and vomiting. Sometimes, there are no symptoms.  Treatment depends on the cause. Antibiotics and antacids might help.  Gastroentereology made recommendations for you to continue antacid medications. Please follow up with your gastroenterologist as an outpatient.    Diagnosis: Hepatic cyst  Assessment and Plan of Treatment: Hepatic cysts were seen on your CT scan and MRI. You have a history of chronic hepatitis B infection. Hepatology was consulted and you are to follow up with your primary doctor. These are characterized as simple cysts and no intervention unless they cause symptoms.

## 2021-05-17 LAB
CULTURE RESULTS: SIGNIFICANT CHANGE UP
E HISTOLYT AB SER-ACNC: NEGATIVE — SIGNIFICANT CHANGE UP
HBV DNA # SERPL NAA+PROBE: SIGNIFICANT CHANGE UP IU/ML
HBV DNA SERPL NAA+PROBE-LOG#: SIGNIFICANT CHANGE UP LOG10IU/ML
SPECIMEN SOURCE: SIGNIFICANT CHANGE UP

## 2021-05-18 LAB — ECHINOCOCCUS AB TITR SER: NEGATIVE — SIGNIFICANT CHANGE UP

## 2021-05-19 LAB
CULTURE RESULTS: SIGNIFICANT CHANGE UP
SPECIMEN SOURCE: SIGNIFICANT CHANGE UP

## 2021-08-18 PROBLEM — K29.70 GASTRITIS, UNSPECIFIED, WITHOUT BLEEDING: Chronic | Status: ACTIVE | Noted: 2021-05-11

## 2021-08-18 PROBLEM — E78.5 HYPERLIPIDEMIA, UNSPECIFIED: Chronic | Status: ACTIVE | Noted: 2021-05-11

## 2021-08-18 PROBLEM — G62.9 POLYNEUROPATHY, UNSPECIFIED: Chronic | Status: ACTIVE | Noted: 2021-05-11

## 2021-08-19 ENCOUNTER — APPOINTMENT (OUTPATIENT)
Dept: VASCULAR SURGERY | Facility: CLINIC | Age: 66
End: 2021-08-19
Payer: MEDICARE

## 2021-08-19 VITALS — DIASTOLIC BLOOD PRESSURE: 74 MMHG | SYSTOLIC BLOOD PRESSURE: 130 MMHG | HEART RATE: 66 BPM

## 2021-08-19 DIAGNOSIS — Z87.09 PERSONAL HISTORY OF OTHER DISEASES OF THE RESPIRATORY SYSTEM: ICD-10-CM

## 2021-08-19 DIAGNOSIS — I78.1 NEVUS, NON-NEOPLASTIC: ICD-10-CM

## 2021-08-19 DIAGNOSIS — Z86.39 PERSONAL HISTORY OF OTHER ENDOCRINE, NUTRITIONAL AND METABOLIC DISEASE: ICD-10-CM

## 2021-08-19 DIAGNOSIS — L72.9 FOLLICULAR CYST OF THE SKIN AND SUBCUTANEOUS TISSUE, UNSPECIFIED: ICD-10-CM

## 2021-08-19 PROBLEM — Z00.00 ENCOUNTER FOR PREVENTIVE HEALTH EXAMINATION: Status: ACTIVE | Noted: 2021-08-19

## 2021-08-19 PROCEDURE — 99203 OFFICE O/P NEW LOW 30 MIN: CPT

## 2021-08-24 PROBLEM — Z86.39 HISTORY OF HYPERLIPIDEMIA: Status: RESOLVED | Noted: 2021-08-24 | Resolved: 2021-08-24

## 2021-08-24 PROBLEM — Z87.09 HISTORY OF ASTHMA: Status: RESOLVED | Noted: 2021-08-24 | Resolved: 2021-08-24

## 2021-08-24 PROBLEM — I78.1 ASYMPTOMATIC SPIDER VEINS OF BOTH LOWER EXTREMITIES: Status: ACTIVE | Noted: 2021-08-24

## 2021-08-24 PROBLEM — L72.9 CYST OF SKIN: Status: ACTIVE | Noted: 2021-08-24

## 2021-08-24 RX ORDER — BACILLUS COAGULANS/INULIN 1B-250 MG
CAPSULE ORAL
Refills: 0 | Status: ACTIVE | COMMUNITY

## 2021-08-24 RX ORDER — GABAPENTIN 300 MG/1
300 CAPSULE ORAL
Refills: 0 | Status: ACTIVE | COMMUNITY

## 2021-08-24 RX ORDER — MONTELUKAST 10 MG/1
10 TABLET, FILM COATED ORAL
Refills: 0 | Status: ACTIVE | COMMUNITY

## 2021-08-24 RX ORDER — SIMVASTATIN 40 MG/1
40 TABLET, FILM COATED ORAL
Refills: 0 | Status: ACTIVE | COMMUNITY

## 2021-08-24 RX ORDER — FAMOTIDINE 40 MG/1
40 TABLET, FILM COATED ORAL
Refills: 0 | Status: ACTIVE | COMMUNITY

## 2022-01-24 NOTE — ED ADULT NURSE NOTE - PAIN: BODY LOCATION
Problem: Knowledge Deficit - Standard  Goal: Patient and family/care givers will demonstrate understanding of plan of care, disease process/condition, diagnostic tests and medications  Outcome: Progressing     Problem: Pain - Standard  Goal: Alleviation of pain or a reduction in pain to the patient’s comfort goal  Outcome: Progressing   The patient is Stable - Low risk of patient condition declining or worsening    Shift Goals  Clinical Goals: stress test  Patient Goals: pain control, rest  Family Goals: n/a    Progress made toward(s) clinical / shift goals:  patient updated on POC    Patient is not progressing towards the following goals:      
The patient is Stable - Low risk of patient condition declining or worsening    Shift Goals  Clinical Goals: pain control, bp control, rest  Patient Goals: pain control, rest  Family Goals: n/a    Progress made toward(s) clinical / shift goals:  Pt able to rest this shift, understands POC. BP controlled & remains free from chest pain    Patient is not progressing towards the following goals:      
abd